# Patient Record
Sex: FEMALE | Race: OTHER | HISPANIC OR LATINO | URBAN - METROPOLITAN AREA
[De-identification: names, ages, dates, MRNs, and addresses within clinical notes are randomized per-mention and may not be internally consistent; named-entity substitution may affect disease eponyms.]

---

## 2017-10-25 ENCOUNTER — EMERGENCY (EMERGENCY)
Facility: HOSPITAL | Age: 42
LOS: 0 days | Discharge: HOME | End: 2017-10-25

## 2017-10-25 DIAGNOSIS — M54.42 LUMBAGO WITH SCIATICA, LEFT SIDE: ICD-10-CM

## 2017-10-25 DIAGNOSIS — M54.5 LOW BACK PAIN: ICD-10-CM

## 2017-10-25 DIAGNOSIS — Z87.891 PERSONAL HISTORY OF NICOTINE DEPENDENCE: ICD-10-CM

## 2017-10-27 PROBLEM — Z00.00 ENCOUNTER FOR PREVENTIVE HEALTH EXAMINATION: Status: ACTIVE | Noted: 2017-10-27

## 2017-11-28 ENCOUNTER — APPOINTMENT (OUTPATIENT)
Dept: INTERNAL MEDICINE | Facility: CLINIC | Age: 42
End: 2017-11-28

## 2017-12-01 ENCOUNTER — EMERGENCY (EMERGENCY)
Facility: HOSPITAL | Age: 42
LOS: 0 days | Discharge: HOME | End: 2017-12-01

## 2017-12-01 DIAGNOSIS — M54.5 LOW BACK PAIN: ICD-10-CM

## 2017-12-01 DIAGNOSIS — M54.42 LUMBAGO WITH SCIATICA, LEFT SIDE: ICD-10-CM

## 2017-12-01 DIAGNOSIS — R20.2 PARESTHESIA OF SKIN: ICD-10-CM

## 2017-12-01 DIAGNOSIS — M62.81 MUSCLE WEAKNESS (GENERALIZED): ICD-10-CM

## 2018-01-24 ENCOUNTER — APPOINTMENT (OUTPATIENT)
Dept: NEUROSURGERY | Facility: CLINIC | Age: 43
End: 2018-01-24
Payer: MEDICAID

## 2018-01-24 PROCEDURE — 99205 OFFICE O/P NEW HI 60 MIN: CPT

## 2020-09-01 ENCOUNTER — TRANSCRIPTION ENCOUNTER (OUTPATIENT)
Age: 45
End: 2020-09-01

## 2020-09-17 ENCOUNTER — TRANSCRIPTION ENCOUNTER (OUTPATIENT)
Age: 45
End: 2020-09-17

## 2021-03-22 ENCOUNTER — TRANSCRIPTION ENCOUNTER (OUTPATIENT)
Age: 46
End: 2021-03-22

## 2022-02-23 ENCOUNTER — EMERGENCY (EMERGENCY)
Facility: HOSPITAL | Age: 47
LOS: 0 days | Discharge: HOME | End: 2022-02-24
Attending: EMERGENCY MEDICINE | Admitting: EMERGENCY MEDICINE
Payer: MEDICAID

## 2022-02-23 VITALS
TEMPERATURE: 98 F | DIASTOLIC BLOOD PRESSURE: 71 MMHG | HEART RATE: 89 BPM | RESPIRATION RATE: 18 BRPM | OXYGEN SATURATION: 97 % | SYSTOLIC BLOOD PRESSURE: 130 MMHG | WEIGHT: 274.03 LBS | HEIGHT: 64 IN

## 2022-02-23 DIAGNOSIS — M54.50 LOW BACK PAIN, UNSPECIFIED: ICD-10-CM

## 2022-02-23 DIAGNOSIS — M25.562 PAIN IN LEFT KNEE: ICD-10-CM

## 2022-02-23 PROCEDURE — 73562 X-RAY EXAM OF KNEE 3: CPT | Mod: 26,LT

## 2022-02-23 PROCEDURE — 93971 EXTREMITY STUDY: CPT | Mod: 26,LT

## 2022-02-23 PROCEDURE — 99284 EMERGENCY DEPT VISIT MOD MDM: CPT

## 2022-02-23 RX ORDER — KETOROLAC TROMETHAMINE 30 MG/ML
30 SYRINGE (ML) INJECTION ONCE
Refills: 0 | Status: DISCONTINUED | OUTPATIENT
Start: 2022-02-23 | End: 2022-02-23

## 2022-02-23 RX ADMIN — Medication 30 MILLIGRAM(S): at 21:34

## 2022-02-23 NOTE — ED PROVIDER NOTE - PHYSICAL EXAMINATION
VITAL SIGNS: I have reviewed nursing notes and confirm.  CONSTITUTIONAL: Well-developed; well-nourished; in no acute distress.  SKIN: Skin exam is warm and dry, no acute rash, no warmth, redness, drainage to LE  HEAD: Normocephalic; atraumatic.  EYES: PERRL, EOM intact; conjunctiva and sclera clear.  CARD: RRR, no murmur.  RESP: No wheezes, rales or rhonchi.  ABD: Normal bowel sounds; soft; non-distended; non-tender  EXT: Normal ROM but has pain with active and passive ROM of L knee. Non pitting edema to L knee, distal thigh and proximal calf/ No clubbing, cyanosis. Good pulses, intact cap refill and sensation, antalgic gait  NEURO: Alert, oriented. Grossly unremarkable. No focal deficits.  PSYCH: Cooperative, appropriate.

## 2022-02-23 NOTE — ED PROVIDER NOTE - OBJECTIVE STATEMENT
46-year-old female with chronic back pain, currently following with pain management, presents to the ED with new left knee pain. Patient notes that on Saturday she twisted her knee, had very mild pain at that time, but has gradually developed worsening pain. Pain radiates up the leg to her left lower back and then across to her right lower back. Pain is worse with ambulation, improved with rest. No she has been taking her prescribed medication from pain management with minimal improvement in her symptoms. She notes associated swelling to the knee, stretching from mid thigh to mid calf.    No recent travel, prolonged immobilization, history of PE or DVT. No associated fever, chills, chest pain or shortness of breath. No associated paresthesias, change in temperature.

## 2022-02-23 NOTE — ED PROVIDER NOTE - PATIENT PORTAL LINK FT
You can access the FollowMyHealth Patient Portal offered by [facility] by registering at the following website: [email or web address]. By joining [employer]’s FollowMyHealth portal, you will also be able to view your health information using other applications (apps) compatible with our system.

## 2022-02-23 NOTE — ED ADULT NURSE NOTE - OBJECTIVE STATEMENT
Pt c/o swelling to left knee x 5 days. Pt states that pain radiates to the middle of her back. Pt states that she tripped and may have injured herself. Pt denies fall.

## 2022-02-23 NOTE — ED ADULT TRIAGE NOTE - CHIEF COMPLAINT QUOTE
I'm having pain and swelling in my left knee going all the way up to my middle back - patient  Patient denies fall, states she stumbled but didn't fall Saturday, pain started late Saturday night

## 2022-02-23 NOTE — ED PROVIDER NOTE - NS ED ROS FT
Constitutional: no fever, chills, no recent weight loss, change in appetite or malaise  Cardiac: No chest pain, SOB or edema.  Respiratory: No cough or respiratory distress  GI: No nausea, vomiting, diarrhea or abdominal pain.  : No dysuria, frequency, urgency or hematuria  MS: see HPI  Neuro: see HPI  Skin: No skin rash.  Except as documented in the HPI, all other systems are negative.

## 2022-02-23 NOTE — ED PROVIDER NOTE - CLINICAL SUMMARY MEDICAL DECISION MAKING FREE TEXT BOX
Duplex negative, XR with effusion but no fracture.    Discussed knee immobilizer and crutches with patient. Patient expressed concerns about getting up stairs due to habitus and immobilizer further worsening mobility. After shared decision making conversation we agreed on an ace wrap and crutches.    Will dc with rest, monitoring of sx, return precautions and follow up with ortho.

## 2022-02-23 NOTE — ED PROVIDER NOTE - CARE PROVIDER_API CALL
Cisco Carpenter)  Orthopaedic Surgery  [address] [place] [address]  Phone: [phone]  Fax: [phone]  Follow Up Time: 7-10 Days

## 2022-03-20 ENCOUNTER — INPATIENT (INPATIENT)
Facility: HOSPITAL | Age: 47
LOS: 1 days | Discharge: HOME | End: 2022-03-22
Attending: STUDENT IN AN ORGANIZED HEALTH CARE EDUCATION/TRAINING PROGRAM | Admitting: STUDENT IN AN ORGANIZED HEALTH CARE EDUCATION/TRAINING PROGRAM
Payer: MEDICAID

## 2022-03-20 VITALS
TEMPERATURE: 97 F | HEART RATE: 91 BPM | SYSTOLIC BLOOD PRESSURE: 137 MMHG | WEIGHT: 278 LBS | DIASTOLIC BLOOD PRESSURE: 77 MMHG | OXYGEN SATURATION: 96 % | HEIGHT: 64 IN | RESPIRATION RATE: 16 BRPM

## 2022-03-20 LAB
ALBUMIN SERPL ELPH-MCNC: 4.6 G/DL — SIGNIFICANT CHANGE UP (ref 3.5–5.2)
ALP SERPL-CCNC: 92 U/L — SIGNIFICANT CHANGE UP (ref 30–115)
ALT FLD-CCNC: 16 U/L — SIGNIFICANT CHANGE UP (ref 0–41)
ANION GAP SERPL CALC-SCNC: 14 MMOL/L — SIGNIFICANT CHANGE UP (ref 7–14)
AST SERPL-CCNC: 14 U/L — SIGNIFICANT CHANGE UP (ref 0–41)
BASOPHILS # BLD AUTO: 0.06 K/UL — SIGNIFICANT CHANGE UP (ref 0–0.2)
BASOPHILS NFR BLD AUTO: 0.5 % — SIGNIFICANT CHANGE UP (ref 0–1)
BILIRUB SERPL-MCNC: 0.3 MG/DL — SIGNIFICANT CHANGE UP (ref 0.2–1.2)
BUN SERPL-MCNC: 19 MG/DL — SIGNIFICANT CHANGE UP (ref 10–20)
CALCIUM SERPL-MCNC: 9.8 MG/DL — SIGNIFICANT CHANGE UP (ref 8.5–10.1)
CHLORIDE SERPL-SCNC: 101 MMOL/L — SIGNIFICANT CHANGE UP (ref 98–110)
CO2 SERPL-SCNC: 25 MMOL/L — SIGNIFICANT CHANGE UP (ref 17–32)
CREAT SERPL-MCNC: 0.7 MG/DL — SIGNIFICANT CHANGE UP (ref 0.7–1.5)
EGFR: 109 ML/MIN/1.73M2 — SIGNIFICANT CHANGE UP
EOSINOPHIL # BLD AUTO: 0.22 K/UL — SIGNIFICANT CHANGE UP (ref 0–0.7)
EOSINOPHIL NFR BLD AUTO: 1.9 % — SIGNIFICANT CHANGE UP (ref 0–8)
GLUCOSE SERPL-MCNC: 128 MG/DL — HIGH (ref 70–99)
HCG SERPL QL: NEGATIVE — SIGNIFICANT CHANGE UP
HCT VFR BLD CALC: 45.7 % — SIGNIFICANT CHANGE UP (ref 37–47)
HGB BLD-MCNC: 15.6 G/DL — SIGNIFICANT CHANGE UP (ref 12–16)
IMM GRANULOCYTES NFR BLD AUTO: 0.5 % — HIGH (ref 0.1–0.3)
LIDOCAIN IGE QN: 48 U/L — SIGNIFICANT CHANGE UP (ref 7–60)
LYMPHOCYTES # BLD AUTO: 3.53 K/UL — HIGH (ref 1.2–3.4)
LYMPHOCYTES # BLD AUTO: 30 % — SIGNIFICANT CHANGE UP (ref 20.5–51.1)
MCHC RBC-ENTMCNC: 31.6 PG — HIGH (ref 27–31)
MCHC RBC-ENTMCNC: 34.1 G/DL — SIGNIFICANT CHANGE UP (ref 32–37)
MCV RBC AUTO: 92.7 FL — SIGNIFICANT CHANGE UP (ref 81–99)
MONOCYTES # BLD AUTO: 0.64 K/UL — HIGH (ref 0.1–0.6)
MONOCYTES NFR BLD AUTO: 5.4 % — SIGNIFICANT CHANGE UP (ref 1.7–9.3)
NEUTROPHILS # BLD AUTO: 7.24 K/UL — HIGH (ref 1.4–6.5)
NEUTROPHILS NFR BLD AUTO: 61.7 % — SIGNIFICANT CHANGE UP (ref 42.2–75.2)
NRBC # BLD: 0 /100 WBCS — SIGNIFICANT CHANGE UP (ref 0–0)
PLATELET # BLD AUTO: 242 K/UL — SIGNIFICANT CHANGE UP (ref 130–400)
POTASSIUM SERPL-MCNC: 3.9 MMOL/L — SIGNIFICANT CHANGE UP (ref 3.5–5)
POTASSIUM SERPL-SCNC: 3.9 MMOL/L — SIGNIFICANT CHANGE UP (ref 3.5–5)
PROT SERPL-MCNC: 7.3 G/DL — SIGNIFICANT CHANGE UP (ref 6–8)
RBC # BLD: 4.93 M/UL — SIGNIFICANT CHANGE UP (ref 4.2–5.4)
RBC # FLD: 13.1 % — SIGNIFICANT CHANGE UP (ref 11.5–14.5)
SARS-COV-2 RNA SPEC QL NAA+PROBE: SIGNIFICANT CHANGE UP
SODIUM SERPL-SCNC: 140 MMOL/L — SIGNIFICANT CHANGE UP (ref 135–146)
TROPONIN T SERPL-MCNC: <0.01 NG/ML — SIGNIFICANT CHANGE UP
WBC # BLD: 11.75 K/UL — HIGH (ref 4.8–10.8)
WBC # FLD AUTO: 11.75 K/UL — HIGH (ref 4.8–10.8)

## 2022-03-20 PROCEDURE — 99220: CPT

## 2022-03-20 PROCEDURE — 71045 X-RAY EXAM CHEST 1 VIEW: CPT | Mod: 26

## 2022-03-20 PROCEDURE — 93010 ELECTROCARDIOGRAM REPORT: CPT

## 2022-03-20 NOTE — ED PROVIDER NOTE - NS ED ATTENDING STATEMENT MOD
This was a shared visit with the PHILL. I reviewed and verified the documentation and independently performed the documented:

## 2022-03-20 NOTE — ED PROVIDER NOTE - OBJECTIVE STATEMENT
47 y/o F with PMH HTN, HLD, pre-DM on metformin, asthma, chronic back pain on gabapentin, presents with moderate intermittent sharp L sided CP x 2 wks, +associated with intermittent sweating. no provoking factors, occasionally resolved with asa. no exertional component/n/v/coughing, fever, other sxs. each episode lasts seconds-to-minutes.   no fhx CAD/SCD.  no prior cardiac workup/has never seen a cardiologist.   She took all her medications tonight.   Meds: metformin, lisinopril, simvastatin, gabapentin. oxy as needed.

## 2022-03-20 NOTE — ED PROVIDER NOTE - NS ED ROS FT
Review of Systems    Constitutional: (-) fever   Eyes/ENT: (-) vision changes  Cardiovascular: (+) chest pain, (-) syncope (-) palpitations  Respiratory: (-) cough, (-) shortness of breath  Gastrointestinal: (-) vomiting, (-) diarrhea (-) abdominal pain  Genitourinary:  (-) dysuria   Musculoskeletal: (-) neck pain, (-) back pain, (-) leg pain/swelling  Integumentary: (-) rash, (-) edema  Neurological: (-) headache, (-) confusion  Hematologic: (-) easy bruising

## 2022-03-20 NOTE — ED PROVIDER NOTE - ATTENDING CONTRIBUTION TO CARE
45 yo f hx chronic back pain follows w/ pain management, htn, hld, pre-dm on metformin, no fhx cad  pt presents for eval of intermittent, substernal cp w/ radiation to L chest. intermittently associated sweating. sx over the past week. no exacerbating or alleviating factors.  no travel/ocp/hx vte    vss  gen- NAD, aaox3  card-rrr  lungs-ctab, no wheezing or rhonchi  abd-sntnd, no guarding or rebound  neuro- full str/sensation, cn ii-xii grossly intact, normal coordination    r/o acs  labs, xr, ekg, trop  if neg, can consider obs

## 2022-03-20 NOTE — ED PROVIDER NOTE - PHYSICAL EXAMINATION
PHYSICAL EXAM:    GENERAL: Alert, appears stated age, well appearing, non-toxic  SKIN: Warm, pink and dry. MMM.   HEAD: NC, AT  EYE: Normal lids/conjunctiva  ENT: Normal hearing, patent oropharynx   NECK: +supple. No meningismus, or JVD  Pulm: Bilateral BS, normal resp effort, no wheezes, stridor, or retractions  CV: RRR, no M/R/G, 2+and = radial pulses  Abd: soft, non-tender, obese. no rebound/guarding.   Mskel: no erythema, cyanosis, edema. no calf tenderness  Neuro: AAOx3. normal gait.

## 2022-03-21 LAB — TROPONIN T SERPL-MCNC: <0.01 NG/ML — SIGNIFICANT CHANGE UP

## 2022-03-21 PROCEDURE — 99221 1ST HOSP IP/OBS SF/LOW 40: CPT

## 2022-03-21 PROCEDURE — 93010 ELECTROCARDIOGRAM REPORT: CPT

## 2022-03-21 PROCEDURE — 75574 CT ANGIO HRT W/3D IMAGE: CPT | Mod: 26,MA

## 2022-03-21 PROCEDURE — 99217: CPT

## 2022-03-21 RX ORDER — METFORMIN HYDROCHLORIDE 850 MG/1
1 TABLET ORAL
Qty: 0 | Refills: 0 | DISCHARGE

## 2022-03-21 RX ORDER — ALBUTEROL 90 UG/1
3 AEROSOL, METERED ORAL
Qty: 0 | Refills: 0 | DISCHARGE

## 2022-03-21 RX ORDER — METOPROLOL TARTRATE 50 MG
100 TABLET ORAL ONCE
Refills: 0 | Status: COMPLETED | OUTPATIENT
Start: 2022-03-21 | End: 2022-03-21

## 2022-03-21 RX ORDER — SIMVASTATIN 20 MG/1
40 TABLET, FILM COATED ORAL AT BEDTIME
Refills: 0 | Status: DISCONTINUED | OUTPATIENT
Start: 2022-03-21 | End: 2022-03-22

## 2022-03-21 RX ORDER — CYCLOBENZAPRINE HYDROCHLORIDE 10 MG/1
1 TABLET, FILM COATED ORAL
Qty: 0 | Refills: 0 | DISCHARGE

## 2022-03-21 RX ORDER — CYCLOBENZAPRINE HYDROCHLORIDE 10 MG/1
10 TABLET, FILM COATED ORAL
Refills: 0 | Status: DISCONTINUED | OUTPATIENT
Start: 2022-03-21 | End: 2022-03-22

## 2022-03-21 RX ORDER — GABAPENTIN 400 MG/1
400 CAPSULE ORAL THREE TIMES A DAY
Refills: 0 | Status: DISCONTINUED | OUTPATIENT
Start: 2022-03-21 | End: 2022-03-22

## 2022-03-21 RX ORDER — REGADENOSON 0.08 MG/ML
0.4 INJECTION, SOLUTION INTRAVENOUS ONCE
Refills: 0 | Status: COMPLETED | OUTPATIENT
Start: 2022-03-21 | End: 2022-03-22

## 2022-03-21 RX ORDER — LISINOPRIL 2.5 MG/1
1 TABLET ORAL
Qty: 0 | Refills: 0 | DISCHARGE

## 2022-03-21 RX ORDER — ASPIRIN/CALCIUM CARB/MAGNESIUM 324 MG
81 TABLET ORAL DAILY
Refills: 0 | Status: DISCONTINUED | OUTPATIENT
Start: 2022-03-21 | End: 2022-03-22

## 2022-03-21 RX ORDER — OXYCODONE AND ACETAMINOPHEN 5; 325 MG/1; MG/1
2 TABLET ORAL EVERY 8 HOURS
Refills: 0 | Status: DISCONTINUED | OUTPATIENT
Start: 2022-03-21 | End: 2022-03-22

## 2022-03-21 RX ORDER — LISINOPRIL 2.5 MG/1
20 TABLET ORAL DAILY
Refills: 0 | Status: DISCONTINUED | OUTPATIENT
Start: 2022-03-22 | End: 2022-03-22

## 2022-03-21 RX ORDER — GABAPENTIN 400 MG/1
1 CAPSULE ORAL
Qty: 0 | Refills: 0 | DISCHARGE

## 2022-03-21 RX ORDER — CARIPRAZINE 1.5 MG/1
1 CAPSULE, GELATIN COATED ORAL
Qty: 0 | Refills: 0 | DISCHARGE

## 2022-03-21 RX ADMIN — GABAPENTIN 400 MILLIGRAM(S): 400 CAPSULE ORAL at 21:21

## 2022-03-21 RX ADMIN — SIMVASTATIN 40 MILLIGRAM(S): 20 TABLET, FILM COATED ORAL at 21:21

## 2022-03-21 RX ADMIN — Medication 100 MILLIGRAM(S): at 08:33

## 2022-03-21 RX ADMIN — Medication 81 MILLIGRAM(S): at 21:22

## 2022-03-21 NOTE — H&P ADULT - HISTORY OF PRESENT ILLNESS
Ms. Foster is a 46yoF with PMH DM2, HTN, HLD, bipolar disorder, obesity, asthma, herniated discs and chronic back pain presenting to [facility] ED on 3/21/22 with chest pain. Per patient, she has been having substernal pain on and off for about 2 weeks. She says it is not associated with activity or anything in particular; the pain comes and goes randomly and it slightly relieved with 81 mg ASA. She says the pain sometimes radiates down her left arm and left leg. She endorses headaches and blurred vision "on and off" for the past month and a half and has been having night sweats for the past 2 weeks. The pain is also associated with nausea but denies emesis.  Ms. Foster denies checking her BP at home. She smokes 1/2 ppd of cigarettes for past 20 years and smokes marijuana daily. She endorses eating fried food daily. She does not follow with a cardiologist and was supposed to see her PCP last week but missed her appointment. Of note, she fell at home 2 weeks ago and underwent a left knee MRI d/t knee swelling and has not obtained results yet.     In ED, HR 80s and /70s. Troponin negative x2, ECG normal and patient underwent CCTA which showed predominantly calcified plaque in the proximal LAD, CHADS-RADS 4/N. Will admit to cards [facility] for ischemic work-up.

## 2022-03-21 NOTE — ED CDU PROVIDER INITIAL DAY NOTE - OBJECTIVE STATEMENT
45 y/o F with PMH HTN, HLD, pre-DM on metformin, asthma, chronic back pain on gabapentin, presents with moderate intermittent sharp L sided CP x 2 wks, +associated with intermittent sweating. no provoking factors, occasionally resolved with asa. no exertional component/n/v/coughing, fever, other sxs. each episode lasts seconds-to-minutes.   no fhx CAD/SCD.  no prior cardiac workup/has never seen a cardiologist.   She took all her medications tonight.   Meds: metformin, lisinopril, simvastatin, gabapentin. oxy as needed.

## 2022-03-21 NOTE — H&P ADULT - NSHPLABSRESULTS_GEN_ALL_CORE
Comprehensive Metabolic Panel (03.20.22 @ 21:47)    Sodium, Serum: 140 mmol/L    Potassium, Serum: 3.9 mmol/L    Chloride, Serum: 101 mmol/L    Carbon Dioxide, Serum: 25 mmol/L    Anion Gap, Serum: 14 mmol/L    Blood Urea Nitrogen, Serum: 19 mg/dL    Creatinine, Serum: 0.7 mg/dL    Glucose, Serum: 128 mg/dL    Calcium, Total Serum: 9.8 mg/dL    Protein Total, Serum: 7.3 g/dL    Albumin, Serum: 4.6 g/dL    Bilirubin Total, Serum: 0.3 mg/dL    Alkaline Phosphatase, Serum: 92 U/L    Aspartate Aminotransferase (AST/SGOT): 14 U/L    Alanine Aminotransferase (ALT/SGPT): 16 U/L    eGFR: 109    Complete Blood Count + Automated Diff (03.20.22 @ 21:47)    WBC Count: 11.75 K/uL    RBC Count: 4.93 M/uL    Hemoglobin: 15.6 g/dL    Hematocrit: 45.7 %    Mean Cell Volume: 92.7 fL    Mean Cell Hemoglobin: 31.6 pg    Mean Cell Hemoglobin Conc: 34.1 g/dL    Red Cell Distrib Width: 13.1 %    Platelet Count - Automated: 242 K/uL    Auto Neutrophil #: 7.24 K/uL    Auto Lymphocyte #: 3.53 K/uL    Auto Monocyte #: 0.64 K/uL    Auto Eosinophil #: 0.22 K/uL    Auto Basophil #: 0.06 K/uL    Auto Neutrophil %: 61.7: Differential percentages must be correlated with absolute numbers for  clinical significance. %    Auto Lymphocyte %: 30.0 %    Auto Monocyte %: 5.4 %    Auto Eosinophil %: 1.9 %    Auto Basophil %: 0.5 %    Auto Immature Granulocyte %: 0.5: (Includes meta, myelo and promyelocytes) %    Nucleated RBC: 0 /100 WBCs    Troponin T, Serum (03.21.22 @ 01:52)    Troponin T, Serum: <0.01 ng/mL  Troponin T, Serum (03.20.22 @ 21:47)    Troponin T, Serum: <0.01 ng/mL    12 Lead ECG (03.21.22 @ 02:31)    Normal sinus rhythm  Left axis deviation  Minimal voltage criteria for LVH, may be normal variant  Abnormal ECG    CT Angio Heart and Coronaries w/ IV Cont (03.21.22 @ 10:17)     Indeterminate level of narrowing due to calcified and   noncalcified plaque within these segments.    Mixed plaque proximal aspect of a PLV branch arising from the RCA   resulting in severe narrowing.    The total Agatston coronary artery calcium score equals 40, which   corresponds to 98th percentile for age, gender and ethnicity.    CAD-RADS 4/N.

## 2022-03-21 NOTE — H&P ADULT - NSHPREVIEWOFSYSTEMS_GEN_ALL_CORE
CONSTITUTIONAL: No weakness, fevers or chills  EYES: Endorses intermittent dizziness  ENT:  No vertigo or throat pain   NECK: No pain or stiffness  RESPIRATORY: No cough, wheezing, hemoptysis; No shortness of breath  CARDIOVASCULAR: Substernal chest pain on and off  GASTROINTESTINAL: No abdominal or epigastric pain. Endorses frequent nausea; No diarrhea or constipation. No melena or hematochezia.  GENITOURINARY: No dysuria, frequency or hematuria  NEUROLOGICAL: Weakness LLE d/t fall 2 weeks prior  SKIN: No itching, rashes  Psych: Hx bipolar

## 2022-03-21 NOTE — ED ADULT NURSE REASSESSMENT NOTE - NS ED NURSE REASSESS COMMENT FT1
Pt is alert and orientedx4, denies chest pains, is still awake talking on her phone, safety in place, 2nd troponin sent to lab, to continue monitoring.

## 2022-03-21 NOTE — ED CDU PROVIDER INITIAL DAY NOTE - MEDICAL DECISION MAKING DETAILS
46-year-old female presents to the ED for left-sided chest pain.  History of hypertension hyperlipidemia and prediabetic on Metformin.  Patient was placed in observation status for CCTA.  Labs reviewed by me noted to be within normal limits.  Troponin negative x2.  ED Chest X-Ray reviewed by me which did not reveal a ptx, infiltrate, or effusion.  EKG noted to have left axis deviation.

## 2022-03-21 NOTE — ED CDU PROVIDER DISPOSITION NOTE - CLINICAL COURSE
Pt obs for cp, had + ccta, admit cards tele per Dr. Hudson Pt obs for cp, had + ccta, admit cards tele per Dr. Hudson    46-year-old female presents to the ED for left-sided chest pain.  History of hypertension hyperlipidemia and prediabetic on Metformin.  Patient was placed in observation status for CCTA.  Labs reviewed by me noted to be within normal limits.  Troponin negative x2.  ED Chest X-Ray reviewed by me which did not reveal a ptx, infiltrate, or effusion.  EKG noted to have left axis deviation.  CCTA performed and noted to have proximal LAD and ramus branch stenoses.  Severe narrowing in the RCA.  CAD RADS 4.  Aspirin given.  Admitted to telemetry for further cardiology evaluation.

## 2022-03-21 NOTE — H&P ADULT - ATTENDING COMMENTS
Patient with severe disease of distal vessel and poor quality imaging of major coronary arteries. Will pursue further work up with NST as above.

## 2022-03-21 NOTE — H&P ADULT - NSHPPHYSICALEXAM_GEN_ALL_CORE
PHYSICAL EXAM:  GENERAL: NAD, lying in bed comfortably  HEAD:  Atraumatic, Normocephalic  EYES: EOMI, PERRLA, conjunctiva and sclera clear  ENT: Moist mucous membranes  NECK: Supple, No JVD  CHEST/LUNG: Clear to auscultation bilaterally; Expiratory wheezing bilaterally  HEART: Regular rate and rhythm; No murmurs, rubs, or gallops  ABDOMEN: Bowel sounds present; Soft, Nontender, Nondistended   EXTREMITIES:  2+ Peripheral Pulses, brisk capillary refill. Generalized +1 edema  NERVOUS SYSTEM:  Alert & Oriented X3, speech clear. No deficits   MSK: FROM all 4 extremities, full and equal strength  SKIN: No rashes or lesions

## 2022-03-21 NOTE — H&P ADULT - ASSESSMENT
Assessment and Plan    Ms. Foster is a 46yoF with PMH DM2, HTN, HLD, bipolar disorder, obesity, asthma, herniated discs and chronic back pain presenting to [facility] ED on 3/21/22 with chest pain. Per patient, she has been having substernal pain on and off for about 2 weeks. She says it is not associated with activity or anything in particular; the pain comes and goes randomly and it slightly relieved with 81 mg ASA. She says the pain sometimes radiates down her left arm and left leg. She endorses headaches and blurred vision "on and off" for the past month and a half and has been having night sweats for the past 2 weeks. The pain is also associated with nausea but denies emesis.  Ms. Foster denies checking her BP at home. She smokes 1/2 ppd of cigarettes for past 20 years and smokes marijuana daily. She endorses eating fried food daily. She does not follow with a cardiologist and was supposed to see her PCP last week but missed her appointment. Of note, she fell at home 2 weeks ago and underwent a left knee MRI d/t knee swelling and has not obtained results yet.     HR 80s and /70s. Troponin negative x2, ECG normal and patient underwent CCTA which showed predominantly calcified plaque in the proximal LAD, CHADS-RADS 4/N. Will admit to [facility] for ischemic work-up.      #1 Chest pain  - Trend troponin, ECG  - Telemetry monitoring  - Risk stratification labs - TSH, lytes in AM  - CCTA showed mild plaque, f/u with pharm nuc stress test to r/o ischemic disease  - Continue ASA 81 daily    #2 HTN  - Continue lisinopril 20 mg daily  - Obtain UA to assess for hypertensive urgency    #3 HLD  - Continue simvastatin 40 mg daily  - Obtain lipid panel in AM    #4 Diabetes  - Finger sticks AC HS  - Sliding scale ordered  - A1C in AM    #5 Asthma  - Albuterol PRN    #6 Chronic pain   - Continue home dose of oxy/aceaminophen 10/325 BID PRN  - Gabapentin 400 mg TID    #7 Bipolar disorder  - Vrayler 1.5 mg daily    #8 Tobacco use  - Educate patient regarding cessation benefits      Diet: NPO for nuclear stress, DASH/TLC after  DVT prophylaxis: Not indicated  GI Prophylaxis: Not indicated  Dispo: Acute, eventual home    Please call [phone] with questions.    Ce Cat NP  Cardiology

## 2022-03-22 ENCOUNTER — TRANSCRIPTION ENCOUNTER (OUTPATIENT)
Age: 47
End: 2022-03-22

## 2022-03-22 VITALS
HEART RATE: 78 BPM | OXYGEN SATURATION: 96 % | SYSTOLIC BLOOD PRESSURE: 132 MMHG | TEMPERATURE: 97 F | RESPIRATION RATE: 18 BRPM | DIASTOLIC BLOOD PRESSURE: 99 MMHG

## 2022-03-22 LAB
A1C WITH ESTIMATED AVERAGE GLUCOSE RESULT: 6.8 % — HIGH (ref 4–5.6)
ANION GAP SERPL CALC-SCNC: 11 MMOL/L — SIGNIFICANT CHANGE UP (ref 7–14)
BUN SERPL-MCNC: 21 MG/DL — HIGH (ref 10–20)
CALCIUM SERPL-MCNC: 9.3 MG/DL — SIGNIFICANT CHANGE UP (ref 8.5–10.1)
CHLORIDE SERPL-SCNC: 108 MMOL/L — SIGNIFICANT CHANGE UP (ref 98–110)
CHOLEST SERPL-MCNC: 217 MG/DL — HIGH
CO2 SERPL-SCNC: 25 MMOL/L — SIGNIFICANT CHANGE UP (ref 17–32)
CREAT SERPL-MCNC: 0.7 MG/DL — SIGNIFICANT CHANGE UP (ref 0.7–1.5)
EGFR: 109 ML/MIN/1.73M2 — SIGNIFICANT CHANGE UP
ESTIMATED AVERAGE GLUCOSE: 148 MG/DL — HIGH (ref 68–114)
GLUCOSE SERPL-MCNC: 122 MG/DL — HIGH (ref 70–99)
HCT VFR BLD CALC: 45.4 % — SIGNIFICANT CHANGE UP (ref 37–47)
HDLC SERPL-MCNC: 38 MG/DL — LOW
HGB BLD-MCNC: 14.9 G/DL — SIGNIFICANT CHANGE UP (ref 12–16)
LIPID PNL WITH DIRECT LDL SERPL: 156 MG/DL — HIGH
MAGNESIUM SERPL-MCNC: 2 MG/DL — SIGNIFICANT CHANGE UP (ref 1.8–2.4)
MCHC RBC-ENTMCNC: 30.8 PG — SIGNIFICANT CHANGE UP (ref 27–31)
MCHC RBC-ENTMCNC: 32.8 G/DL — SIGNIFICANT CHANGE UP (ref 32–37)
MCV RBC AUTO: 94 FL — SIGNIFICANT CHANGE UP (ref 81–99)
NON HDL CHOLESTEROL: 179 MG/DL — HIGH
NRBC # BLD: 0 /100 WBCS — SIGNIFICANT CHANGE UP (ref 0–0)
PLATELET # BLD AUTO: 232 K/UL — SIGNIFICANT CHANGE UP (ref 130–400)
POTASSIUM SERPL-MCNC: 4.5 MMOL/L — SIGNIFICANT CHANGE UP (ref 3.5–5)
POTASSIUM SERPL-SCNC: 4.5 MMOL/L — SIGNIFICANT CHANGE UP (ref 3.5–5)
RBC # BLD: 4.83 M/UL — SIGNIFICANT CHANGE UP (ref 4.2–5.4)
RBC # FLD: 12.9 % — SIGNIFICANT CHANGE UP (ref 11.5–14.5)
SODIUM SERPL-SCNC: 144 MMOL/L — SIGNIFICANT CHANGE UP (ref 135–146)
TRIGL SERPL-MCNC: 143 MG/DL — SIGNIFICANT CHANGE UP
TROPONIN T SERPL-MCNC: <0.01 NG/ML — SIGNIFICANT CHANGE UP
WBC # BLD: 9.02 K/UL — SIGNIFICANT CHANGE UP (ref 4.8–10.8)
WBC # FLD AUTO: 9.02 K/UL — SIGNIFICANT CHANGE UP (ref 4.8–10.8)

## 2022-03-22 PROCEDURE — 93018 CV STRESS TEST I&R ONLY: CPT

## 2022-03-22 PROCEDURE — 93016 CV STRESS TEST SUPVJ ONLY: CPT

## 2022-03-22 PROCEDURE — 78452 HT MUSCLE IMAGE SPECT MULT: CPT | Mod: 26

## 2022-03-22 PROCEDURE — 99238 HOSP IP/OBS DSCHRG MGMT 30/<: CPT | Mod: 25

## 2022-03-22 RX ORDER — SIMVASTATIN 20 MG/1
1 TABLET, FILM COATED ORAL
Qty: 0 | Refills: 0 | DISCHARGE

## 2022-03-22 RX ORDER — ASPIRIN/CALCIUM CARB/MAGNESIUM 324 MG
1 TABLET ORAL
Qty: 30 | Refills: 0
Start: 2022-03-22 | End: 2022-04-20

## 2022-03-22 RX ORDER — ATORVASTATIN CALCIUM 80 MG/1
1 TABLET, FILM COATED ORAL
Qty: 30 | Refills: 0
Start: 2022-03-22 | End: 2022-04-20

## 2022-03-22 RX ADMIN — REGADENOSON 0.4 MILLIGRAM(S): 0.08 INJECTION, SOLUTION INTRAVENOUS at 12:08

## 2022-03-22 NOTE — DISCHARGE NOTE PROVIDER - NSDCCPCAREPLAN_GEN_ALL_CORE_FT
PRINCIPAL DISCHARGE DIAGNOSIS  Diagnosis: Unstable angina  Assessment and Plan of Treatment: YOu presented to the hospital with chest pain. There is a small blockage in one of the arteries in your heart, however, this is not causing any ischemia. Be sure to continue to take Aspirin and Simvastatin. If you have any chest pain or discomfort, be sure to notify your provider or return to the hospital.       PRINCIPAL DISCHARGE DIAGNOSIS  Diagnosis: Unstable angina  Assessment and Plan of Treatment: YOu presented to the hospital with chest pain. There is a small blockage in one of the arteries in your heart, however, this is not causing any ischemia. Be sure to take Aspirin 81 mg daily. We recommend that you stop taking Simvastatin and take Atorvastatin 80 mg instead as your cholesterol was elevated.  If you have any chest pain or discomfort, be sure to notify your provider or return to the hospital.

## 2022-03-22 NOTE — PROGRESS NOTE ADULT - SUBJECTIVE AND OBJECTIVE BOX
Patient Information:  DIOGENES OLIVARES / 46y / Female / MRN#:[id number]    Hospital Day: 1d    Interval History:  Patient seen and examined at bedside. No acute events overnight. Pt reports her chest pain is improved, did have a few episodes of mild L sided chest discomfort, describes as sharp, which self resolved overnight.    Past Medical History:    Past Surgical History:    Allergies:  No Known Allergies    Medications:  PRN:  cyclobenzaprine 10 milliGRAM(s) Oral two times a day PRN Muscle Spasm  oxycodone    5 mG/acetaminophen 325 mG 2 Tablet(s) Oral every 8 hours PRN Moderate Pain (4 - 6)    Standing:  aspirin enteric coated 81 milliGRAM(s) Oral daily  gabapentin 400 milliGRAM(s) Oral three times a day  lisinopril 20 milliGRAM(s) Oral daily  regadenoson Injectable 0.4 milliGRAM(s) IV Push once  simvastatin 40 milliGRAM(s) Oral at bedtime    Home:  albuterol 2.5 mg/3 mL (0.083%) inhalation solution: 3 milliliter(s) inhaled every 6 hours, As Needed  cyclobenzaprine 10 mg oral tablet: 1 tab(s) orally 2 times a day  gabapentin 400 mg oral capsule: 1 cap(s) orally 3 times a day  lisinopril 20 mg oral tablet: 1 tab(s) orally once a day  metFORMIN 1000 mg oral tablet, extended release: 1 tab(s) orally once a day  oxycodone-acetaminophen 10 mg-325 mg oral tablet: 1 tab(s) orally 2 times a day, As Needed  simvastatin 40 mg oral tablet: 1 tab(s) orally once a day (at bedtime)  Vraylar 1.5 mg oral capsule: 1 cap(s) orally once a day    Vitals:  T(C): 36.3, Max: 36.3 (03-22-22 @ 02:20)  T(F): 97.4, Max: 97.4 (03-22-22 @ 02:20)  HR: 78 (72 - 78)  BP: 131/83 (120/58 - 131/83)  RR: 18 (18 - 18)  SpO2: 94% (94% - 96%)    Physical Exam:  General: Awake, alert, NAD, resting comfortably in bed, on RA  HEENT: Head NC/AT  Heart: RRR; S1/S2; no rubs, murmurs appreciated  Lungs: Clear to auscultation bilaterally, no wheezing, rales or rhonchi  Abdomen: Soft, nontender, nondistended, BS+  Extremities: No edema, clubbing, cyanosis in upper or lower extremities.  Neuro: AAOx3, NFD.    Labs:  CBC (03-22 @ 06:30)                        Hgb: 14.9   WBC: 9.02  )-----------------( Plts: 232                              Hct: 45.4     Chem (03-22 @ 06:30)  Na: 144  |     Cl: 108     |  BUN: 21  -----------------------------------------< Gluc: 122    K: 4.5   |    HCO3: 25  |  Cr: 0.7    Ca 9.3 (03-22 @ 06:30)  Mg 2.0 (03-22 @ 06:30)    LFTs (03-20 @ 21:47)  TPro 7.3  /  Alb 4.6  TBili 0.3  /  DBili     AST 14  /  ALT 16  /  AlkPhos 92    Cardiac Markers (03-22 @ 06:30)  Troponin I X  Troponin T <0.01  CK X  CKMB X  CKMB Units X  Myoglobin X  Lactate X  ESR X    Cardiac Markers (03-21 @ 01:52)  Troponin I X  Troponin T <0.01  CK X  CKMB X  CKMB Units X  Myoglobin X  Lactate X  ESR X    Cardiac Markers (03-20 @ 21:47)  Troponin I X  Troponin T <0.01  CK X  CKMB X  CKMB Units X  Myoglobin X  Lactate X  ESR X

## 2022-03-22 NOTE — DISCHARGE NOTE PROVIDER - NSDCMRMEDTOKEN_GEN_ALL_CORE_FT
albuterol 2.5 mg/3 mL (0.083%) inhalation solution: 3 milliliter(s) inhaled every 6 hours, As Needed  aspirin 81 mg oral delayed release tablet: 1 tab(s) orally once a day  cyclobenzaprine 10 mg oral tablet: 1 tab(s) orally 2 times a day  gabapentin 400 mg oral capsule: 1 cap(s) orally 3 times a day  lisinopril 20 mg oral tablet: 1 tab(s) orally once a day  metFORMIN 1000 mg oral tablet, extended release: 1 tab(s) orally once a day  oxycodone-acetaminophen 10 mg-325 mg oral tablet: 1 tab(s) orally 2 times a day, As Needed  simvastatin 40 mg oral tablet: 1 tab(s) orally once a day (at bedtime)  Vraylar 1.5 mg oral capsule: 1 cap(s) orally once a day   albuterol 2.5 mg/3 mL (0.083%) inhalation solution: 3 milliliter(s) inhaled every 6 hours, As Needed  aspirin 81 mg oral delayed release tablet: 1 tab(s) orally once a day  atorvastatin 80 mg oral tablet: 1 tab(s) orally once a day (at bedtime)   cyclobenzaprine 10 mg oral tablet: 1 tab(s) orally 2 times a day  gabapentin 400 mg oral capsule: 1 cap(s) orally 3 times a day  lisinopril 20 mg oral tablet: 1 tab(s) orally once a day  metFORMIN 1000 mg oral tablet, extended release: 1 tab(s) orally once a day  oxycodone-acetaminophen 10 mg-325 mg oral tablet: 1 tab(s) orally 2 times a day, As Needed  Vraylar 1.5 mg oral capsule: 1 cap(s) orally once a day

## 2022-03-22 NOTE — DISCHARGE NOTE PROVIDER - PROVIDER TOKENS
PROVIDER:[TOKEN:[[id number]],FOLLOWUP:[2 weeks]] PROVIDER:[TOKEN:[[id number]],FOLLOWUP:[2 weeks]],PROVIDER:[TOKEN:[[id number]],FOLLOWUP:[1 month]]

## 2022-03-22 NOTE — PROGRESS NOTE ADULT - ASSESSMENT
45yo F with PMHx of DM, HTN, DLD, Bipolar, Obesity, Asthma, Chronic Back pain presents with chest pain.    #Unstable Angina  - Troponin within normal limits  - CCTA - mild plaque in proximal LAD  - Pending NST today to rule out ishemic heart disease 45yo F with PMHx of DM, HTN, DLD, Bipolar, Obesity, Asthma, Chronic Back pain presents with chest pain.    #Unstable Angina  #DLD  - Troponin within normal limits  - EKG - no ischemic changes  - , A1c pending  - CCTA - mild plaque in proximal LAD  - Pending NST today to rule out ischemic heart disease    #HTN  - C/w Lisinopril    #Chronic Back Pain  - C/w Gabapentin    Diet; DASH, NPO for NST  Activity: IAT  Full Code  Dispo: Pending NST

## 2022-03-22 NOTE — DISCHARGE NOTE PROVIDER - CARE PROVIDER_API CALL
Harjinder Robert (NP; RN)  NP in Family Health  [address] [place] [address]  Phone: [phone]  Fax: [phone]  Follow Up Time: 2 weeks   Harjinder Robert (NP; RN)  NP in Family Health  [address] [place] [address]  Phone: [phone]  Fax: [phone]  Follow Up Time: 2 weeks    Leandra Pandya; MPH)  Internal Medicine  [address] [place] [address] [address]  [place] [address]  Phone: [phone]  Fax: [phone]  Follow Up Time: 1 month

## 2022-03-22 NOTE — DISCHARGE NOTE PROVIDER - CARE PROVIDERS DIRECT ADDRESSES
[email or web address]

## 2022-03-22 NOTE — DISCHARGE NOTE PROVIDER - HOSPITAL COURSE
46yoF with PMH DM2, HTN, HLD, bipolar disorder, obesity, asthma, herniated discs and chronic back pain presenting to [facility] ED on 3/21/22 with chest pain. Per patient, she has been having substernal pain on and off for about 2 weeks. She says it is not associated with activity or anything in particular; the pain comes and goes randomly and it slightly relieved with 81 mg ASA. She says the pain sometimes radiates down her left arm and left leg. She endorses headaches and blurred vision "on and off" for the past month and a half and has been having night sweats for the past 2 weeks. The pain is also associated with nausea but denies emesis.  Ms. Foster denies checking her BP at home. She smokes 1/2 ppd of cigarettes for past 20 years and smokes marijuana daily. She endorses eating fried food daily. She does not follow with a cardiologist and was supposed to see her PCP last week but missed her appointment. Of note, she fell at home 2 weeks ago and underwent a left knee MRI d/t knee swelling and has not obtained results yet.     In ED, HR 80s and /70s. Troponin negative x2, ECG normal and patient underwent CCTA which showed predominantly calcified plaque in the proximal LAD, CHADS-RADS 4/N. NST revealed no ischemia. Stable for discharge home. 46yoF with PMH DM2, HTN, HLD, bipolar disorder, obesity, asthma, herniated discs and chronic back pain presenting to [facility] ED on 3/21/22 with chest pain. Per patient, she has been having substernal pain on and off for about 2 weeks. She says it is not associated with activity or anything in particular; the pain comes and goes randomly and it slightly relieved with 81 mg ASA. She says the pain sometimes radiates down her left arm and left leg. She endorses headaches and blurred vision "on and off" for the past month and a half and has been having night sweats for the past 2 weeks. The pain is also associated with nausea but denies emesis.  Ms. Foster denies checking her BP at home. She smokes 1/2 ppd of cigarettes for past 20 years and smokes marijuana daily. She endorses eating fried food daily. She does not follow with a cardiologist and was supposed to see her PCP last week but missed her appointment. Of note, she fell at home 2 weeks ago and underwent a left knee MRI d/t knee swelling and has not obtained results yet.     In ED, HR 80s and /70s. Troponin negative x2, ECG normal and patient underwent CCTA which showed predominantly calcified plaque in the proximal LAD, CHADS-RADS 4/N. NST revealed no ischemia. Stable for discharge home on ASA 81 mg and atorvastatin 80 mg nightly. She will follow up with Dr. Pandya as an outpatient.

## 2022-03-22 NOTE — DISCHARGE NOTE NURSING/CASE MANAGEMENT/SOCIAL WORK - PATIENT PORTAL LINK FT
You can access the FollowMyHealth Patient Portal offered by [facility] by registering at the following website: [email or web address]. By joining [employer]’s FollowMyHealth portal, you will also be able to view your health information using other applications (apps) compatible with our system.

## 2022-03-22 NOTE — DISCHARGE NOTE PROVIDER - NSDCQMSTROKE_NEU_ALL_CORE
Discharge criteria met. Post procedure dressing dry and intact. Sensory and motor function intact as per pre-procedure. Patient alert and oriented x3  Instructions and follow up reviewed with pt at patient at discharge. Discharged home transported by wheelchair, accompanied by family .   Discharged @   No

## 2022-03-22 NOTE — DISCHARGE NOTE NURSING/CASE MANAGEMENT/SOCIAL WORK - NSDCPEFALRISK_GEN_ALL_CORE
For information on Fall & Injury Prevention, visit: [email or web address] OR  [email or web address] OR  https://www.cdc.gov/steadi/patient.html

## 2022-03-23 PROBLEM — Z00.00 ENCOUNTER FOR PREVENTIVE HEALTH EXAMINATION: Noted: 2022-03-23

## 2022-03-25 DIAGNOSIS — G89.29 OTHER CHRONIC PAIN: ICD-10-CM

## 2022-03-25 DIAGNOSIS — Z79.84 LONG TERM (CURRENT) USE OF ORAL HYPOGLYCEMIC DRUGS: ICD-10-CM

## 2022-03-25 DIAGNOSIS — E66.9 OBESITY, UNSPECIFIED: ICD-10-CM

## 2022-03-25 DIAGNOSIS — R73.03 PREDIABETES: ICD-10-CM

## 2022-03-25 DIAGNOSIS — E78.5 HYPERLIPIDEMIA, UNSPECIFIED: ICD-10-CM

## 2022-03-25 DIAGNOSIS — F31.9 BIPOLAR DISORDER, UNSPECIFIED: ICD-10-CM

## 2022-03-25 DIAGNOSIS — R07.9 CHEST PAIN, UNSPECIFIED: ICD-10-CM

## 2022-03-25 DIAGNOSIS — I10 ESSENTIAL (PRIMARY) HYPERTENSION: ICD-10-CM

## 2022-03-25 DIAGNOSIS — J45.909 UNSPECIFIED ASTHMA, UNCOMPLICATED: ICD-10-CM

## 2022-03-25 DIAGNOSIS — M54.9 DORSALGIA, UNSPECIFIED: ICD-10-CM

## 2022-03-25 DIAGNOSIS — I25.110 ATHEROSCLEROTIC HEART DISEASE OF NATIVE CORONARY ARTERY WITH UNSTABLE ANGINA PECTORIS: ICD-10-CM

## 2022-04-28 ENCOUNTER — APPOINTMENT (OUTPATIENT)
Dept: CARDIOLOGY | Facility: CLINIC | Age: 47
End: 2022-04-28
Payer: MEDICAID

## 2022-04-28 VITALS
DIASTOLIC BLOOD PRESSURE: 82 MMHG | HEART RATE: 73 BPM | SYSTOLIC BLOOD PRESSURE: 130 MMHG | BODY MASS INDEX: 50.02 KG/M2 | WEIGHT: 293 LBS | OXYGEN SATURATION: 95 % | HEIGHT: 64 IN | TEMPERATURE: 97.3 F

## 2022-04-28 PROCEDURE — 93000 ELECTROCARDIOGRAM COMPLETE: CPT

## 2022-04-28 PROCEDURE — 99214 OFFICE O/P EST MOD 30 MIN: CPT

## 2022-04-28 RX ORDER — ASPIRIN 81 MG
81 TABLET, DELAYED RELEASE (ENTERIC COATED) ORAL DAILY
Refills: 0 | Status: ACTIVE | COMMUNITY

## 2022-04-28 RX ORDER — METFORMIN HYDROCHLORIDE 1000 MG/1
1000 TABLET, COATED ORAL TWICE DAILY
Refills: 0 | Status: ACTIVE | COMMUNITY

## 2022-04-28 RX ORDER — ALBUTEROL SULFATE 2.5 MG/3ML
(2.5 MG/3ML) SOLUTION RESPIRATORY (INHALATION) 4 TIMES DAILY
Refills: 0 | Status: ACTIVE | COMMUNITY

## 2022-04-28 RX ORDER — OXYCODONE 10 MG/1
10 TABLET ORAL TWICE DAILY
Refills: 0 | Status: ACTIVE | COMMUNITY

## 2022-04-28 RX ORDER — GABAPENTIN 400 MG/1
400 CAPSULE ORAL 3 TIMES DAILY
Refills: 0 | Status: ACTIVE | COMMUNITY

## 2022-04-30 NOTE — CARDIOLOGY SUMMARY
[de-identified] : EKG 4/28/22 Normal sinus rhythm 73 bpm, LAD, LVH\par  [de-identified] : Cor CTA 3/21/22 motion degraded exam, multiple segments not reliably evaluated [de-identified] : Regadenoson NST 3/22/22 no evidence of ischemia, nl LV wall motion, LVEF 63%

## 2022-04-30 NOTE — HISTORY OF PRESENT ILLNESS
[FreeTextEntry1] : 46F  (no gDM, gHTN, preeclampsia)  with T2DM, HTN, DLD, asthma here for hospital follow-up and to establish care.\par \par Went to ED 3/21/22 for chest pain. \par /70s, trop negative, \par Cor CTA with plaque, but motion degraded. \par Nuclear stress test with no ischemia\par Discharged home on ASA and atorva 80\par \par Feeling okay. No chest pain, shortness of breath, palpitations, lightheadedness/dizziness, pre-syncope/syncope, or lower extremity edema.\par \par Baking more, less fried foods. Trying to lose weight\par \par Exercise: following 2 and 5 yo around\par Tries to walk, gets tired and has sciatica. \par \par Smoking since 14 years old, 1/2 ppd \par stopped for 7 months 10 years ago (cold turkey)\par MJ daily

## 2022-04-30 NOTE — ASSESSMENT
[FreeTextEntry1] : Assessment:\par #CAD\par - CCTA 3/2022 with plaque, severity indeterminate\par - NST 3/2022 negative for ischemia\par #HTN\par - 130/82 \par #HLD\par - 3/22/22 , , HDL 38,  \par - 4/7/22 , , HDL 38, LDL 81 on atorva 80 for about 3 weeks\par #T2DM\par - 3/22/22 Hg A1c 6.8%\par #BMI 51\par #Active smoker\par \par 3/22/22\par Hgb 14.9, K 4.5, Cr 0.7, AST 14, ALT 17\par \par Plan:\par - Counseled patient on aggressive risk factor reduction with diet, exercise, smoking cessation\par - Referral to dietician\par - Smoking cessation - referral to cessation program (Patient's number [phone])\par - Continue ASA 81 mg daily, atorva 80, lisinopril 40 mg daily\par - Return to clinic in 3 months\par

## 2022-04-30 NOTE — PHYSICAL EXAM
[Obese] : obese [No Carotid Bruit] : no carotid bruit [Clear Lung Fields] : clear lung fields [Moves all extremities] : moves all extremities [No Focal Deficits] : no focal deficits [Normal Speech] : normal speech [No edema] : no edema [Present] : present bilaterally [de-identified] : difficult to auscultate heart sounds due to body habitus

## 2022-04-30 NOTE — REVIEW OF SYSTEMS
[Fever] : no fever [Dyspnea on exertion] : dyspnea during exertion [Chest Discomfort] : no chest discomfort [Lower Ext Edema] : no extremity edema [Leg Claudication] : no intermittent leg claudication [Palpitations] : no palpitations [Orthopnea] : no orthopnea [PND] : no PND [Syncope] : no syncope [Cough] : no cough [Dizziness] : no dizziness [FreeTextEntry9] : sciatica limits her ability to walk

## 2022-06-02 ENCOUNTER — APPOINTMENT (OUTPATIENT)
Dept: NUTRITION | Facility: CLINIC | Age: 47
End: 2022-06-02

## 2022-10-10 ENCOUNTER — APPOINTMENT (OUTPATIENT)
Dept: CARDIOLOGY | Facility: CLINIC | Age: 47
End: 2022-10-10

## 2022-10-10 VITALS
BODY MASS INDEX: 49.68 KG/M2 | SYSTOLIC BLOOD PRESSURE: 124 MMHG | HEART RATE: 94 BPM | WEIGHT: 291 LBS | HEIGHT: 64 IN | DIASTOLIC BLOOD PRESSURE: 76 MMHG

## 2022-10-10 PROCEDURE — 93000 ELECTROCARDIOGRAM COMPLETE: CPT

## 2022-10-10 PROCEDURE — 99214 OFFICE O/P EST MOD 30 MIN: CPT | Mod: 25

## 2022-10-10 RX ORDER — BUDESONIDE AND FORMOTEROL FUMARATE DIHYDRATE 160; 4.5 UG/1; UG/1
160-4.5 AEROSOL RESPIRATORY (INHALATION)
Qty: 10 | Refills: 0 | Status: ACTIVE | COMMUNITY
Start: 2022-07-28

## 2022-10-10 NOTE — REVIEW OF SYSTEMS
[Dyspnea on exertion] : dyspnea during exertion [Fever] : no fever [Chest Discomfort] : no chest discomfort [Lower Ext Edema] : no extremity edema [Leg Claudication] : no intermittent leg claudication [Palpitations] : no palpitations [Orthopnea] : no orthopnea [PND] : no PND [Syncope] : no syncope [Cough] : no cough [Dizziness] : no dizziness [FreeTextEntry9] : LLE sciatica limits her ability to exercise

## 2022-10-10 NOTE — CARDIOLOGY SUMMARY
[de-identified] : EKG 10/10/22 Normal sinus rhythm 94 bpm, LAD, LVH\par EKG 4/28/22 Normal sinus rhythm 73 bpm, LAD, LVH\par  [de-identified] : Cor CTA 3/21/22 motion degraded exam, multiple segments not reliably evaluated [de-identified] : Regadenoson NST 3/22/22 no evidence of ischemia, nl LV wall motion, LVEF 63%

## 2022-10-10 NOTE — HISTORY OF PRESENT ILLNESS
[FreeTextEntry1] : 46F  (no gDM, gHTN, preeclampsia)  with T2DM, HTN, DLD, asthma here for follow-up. \par \par 10/10/22\par Walking more, every other day or daily 2-3 hours. Some SOB. No chest pain.\par \par Drinking water. Eating smaller portions, baking meat, more vegetables. Less fried foods, eating out. Interested in speaking with a dietician. \par \par Still smoking. More than before due to stress at home. Looking for a new place to live.\par \par Compliant with medications. \par \par 22\par Went to ED 3/21/22 for chest pain. \par /70s, trop negative, \par Cor CTA with plaque, but motion degraded. \par Nuclear stress test with no ischemia\par Discharged home on ASA and atorva 80\par \par Feeling okay. No chest pain, shortness of breath, palpitations, lightheadedness/dizziness, pre-syncope/syncope, or lower extremity edema.\par \par Baking more, less fried foods. Trying to lose weight\par \par Exercise: following 2 and 5 yo around\par Tries to walk, gets tired and has sciatica. \par \par Smoking since 14 years old, 1/2 ppd \par stopped for 7 months 10 years ago (cold turkey)\par MJ daily

## 2022-10-10 NOTE — PHYSICAL EXAM
[Obese] : obese [No Carotid Bruit] : no carotid bruit [Clear Lung Fields] : clear lung fields [Moves all extremities] : moves all extremities [No Focal Deficits] : no focal deficits [Normal Speech] : normal speech [No edema] : no edema [Present] : present bilaterally [de-identified] : difficult to auscultate heart sounds due to body habitus

## 2022-10-10 NOTE — ASSESSMENT
[FreeTextEntry1] : Assessment:\par #CAD\par - CCTA 3/2022 with plaque, severity indeterminate\par - NST 3/2022 negative for ischemia\par #HTN - controlled\par #HLD\par - 3/22/22 , , HDL 38,  \par - 4/7/22 , , HDL 38, LDL 81 on atorva 80 for about 3 weeks\par #T2DM\par - 3/22/22 Hg A1c 6.8%\par #BMI 49.9\par - 10 lb weight loss since last visit\par #Active smoker\par \par Plan:\par - Counseled patient on aggressive risk factor reduction with diet, exercise, smoking cessation\par - Referral to dietician, patient ready to speak with RD now\par - Smoking cessation - referral to cessation program (Patient's number [phone])\par - Continue ASA 81 mg daily, atorva 80, lisinopril 40 mg daily\par - Labs prior to next visit: CMP, Hgb A1c, lipid profile, lpa\par - Return to clinic in 6 months\par

## 2022-10-11 ENCOUNTER — NON-APPOINTMENT (OUTPATIENT)
Age: 47
End: 2022-10-11

## 2022-11-02 ENCOUNTER — NON-APPOINTMENT (OUTPATIENT)
Age: 47
End: 2022-11-02

## 2022-11-03 ENCOUNTER — APPOINTMENT (OUTPATIENT)
Dept: NUTRITION | Facility: CLINIC | Age: 47
End: 2022-11-03

## 2022-11-03 ENCOUNTER — OUTPATIENT (OUTPATIENT)
Dept: OUTPATIENT SERVICES | Facility: HOSPITAL | Age: 47
LOS: 1 days | Discharge: HOME | End: 2022-11-03

## 2023-03-03 NOTE — ED PROVIDER NOTE - NSFOLLOWUPINSTRUCTIONS_ED_ALL_ED_FT
Take ibuprofen 600mg (3 200mg tablets) every 8 hours for the next 5 days. This will help with inflammation as well as pain. Continue to take your other medication.      Knee Pain    WHAT YOU NEED TO KNOW:    Knee pain may start suddenly, or it may be a long-term problem. You may have pain on the side, front, or back of your knee. You may have knee stiffness and swelling. You may hear popping sounds or feel like your knee is giving way or locking up as you walk. You may feel pain when you sit, stand, walk, or climb up and down stairs. Knee pain can be caused by conditions such as obesity, inflammation, or strains or tears in ligaments or tendons.     DISCHARGE INSTRUCTIONS:    Return to the emergency department if:   •Your pain is worse, even after treatment.       •You cannot bend or straighten your leg completely.       •The swelling around your knee does not go down even with treatment.      •Your knee is painful and hot to the touch.       Contact your healthcare provider if:   •You have questions or concerns about your condition or care.           Medicines: You may need any of the following:   •NSAIDs help decrease swelling and pain or fever. This medicine is available with or without a doctor's order. NSAIDs can cause stomach bleeding or kidney problems in certain people. If you take blood thinner medicine, always ask your healthcare provider if NSAIDs are safe for you. Always read the medicine label and follow directions.      •Acetaminophen decreases pain and fever. It is available without a doctor's order. Ask how much to take and how often to take it. Follow directions. Read the labels of all other medicines you are using to see if they also contain acetaminophen, or ask your doctor or pharmacist. Acetaminophen can cause liver damage if not taken correctly. Do not use more than 4 grams (4,000 milligrams) total of acetaminophen in one day.       •Prescription pain medicine may be given. Ask your healthcare provider how to take this medicine safely. Some prescription pain medicines contain acetaminophen. Do not take other medicines that contain acetaminophen without talking to your healthcare provider. Too much acetaminophen may cause liver damage. Prescription pain medicine may cause constipation. Ask your healthcare provider how to prevent or treat constipation.       •Take your medicine as directed. Contact your healthcare provider if you think your medicine is not helping or if you have side effects. Tell him or her if you are allergic to any medicine. Keep a list of the medicines, vitamins, and herbs you take. Include the amounts, and when and why you take them. Bring the list or the pill bottles to follow-up visits. Carry your medicine list with you in case of an emergency.      What you can do to manage your symptoms:   •Rest your knee so it can heal. Limit activities that increase your pain. Do low-impact exercises, such as walking or swimming.       •Apply ice to help reduce swelling and pain. Use an ice pack, or put crushed ice in a plastic bag. Cover it with a towel before you apply it to your knee. Apply ice for 15 to 20 minutes every hour, or as directed.      •Apply compression to help reduce swelling. Use a brace or bandage only as directed.      •Elevate your knee to help decrease pain and swelling. Elevate your knee while you are sitting or lying down. Prop your leg on pillows to keep your knee above the level of your heart.      •Prevent your knee from moving as directed. Your healthcare provider may put on a cast or splint. You may need to wear a leg brace to stabilize your knee. A leg brace can be adjusted to increase your range of motion as your knee heals.      What you can do to prevent knee pain:   •Maintain a healthy weight. Extra weight increases your risk for knee pain. Ask your healthcare provider how much you should weigh. He or she can help you create a safe weight loss plan if you need to lose weight.      •Exercise or train properly. Use the correct equipment for sports. Wear shoes that provide good support. Check your posture often as you exercise, play sports, or train for an event. This can help prevent stress and strain on your knees. Rest between sessions so you do not overwork your knees.      Follow up with your healthcare provider within 24 hours or as directed: You may need follow-up treatments, such as steroid injections to decrease pain. Write down your questions so you remember to ask them during your visits. cancer

## 2023-03-13 ENCOUNTER — RX RENEWAL (OUTPATIENT)
Age: 48
End: 2023-03-13

## 2023-03-29 ENCOUNTER — RX RENEWAL (OUTPATIENT)
Age: 48
End: 2023-03-29

## 2023-04-03 ENCOUNTER — APPOINTMENT (OUTPATIENT)
Dept: CARDIOLOGY | Facility: CLINIC | Age: 48
End: 2023-04-03
Payer: MEDICAID

## 2023-04-03 VITALS
BODY MASS INDEX: 47.97 KG/M2 | WEIGHT: 281 LBS | HEART RATE: 89 BPM | HEIGHT: 64 IN | DIASTOLIC BLOOD PRESSURE: 80 MMHG | SYSTOLIC BLOOD PRESSURE: 130 MMHG | OXYGEN SATURATION: 96 %

## 2023-04-03 DIAGNOSIS — Z71.82 EXERCISE COUNSELING: ICD-10-CM

## 2023-04-03 DIAGNOSIS — R06.83 SNORING: ICD-10-CM

## 2023-04-03 DIAGNOSIS — Z71.3 DIETARY COUNSELING AND SURVEILLANCE: ICD-10-CM

## 2023-04-03 PROCEDURE — 99214 OFFICE O/P EST MOD 30 MIN: CPT | Mod: 25

## 2023-04-03 PROCEDURE — 93000 ELECTROCARDIOGRAM COMPLETE: CPT

## 2023-04-03 NOTE — HISTORY OF PRESENT ILLNESS
[FreeTextEntry1] : 47F  (no gDM, gHTN, preeclampsia)  with T2DM, HTN, DLD, asthma here for follow-up. \par \par 4/3/23\par Poor quality sleep. +snoring\par Occasional sharp CP. \par Trying to lose weight. Walk distance has improved. No exertional CP. \par Losing weight. Eating healthier. Minimal red meat. \par \par Labs done 23. Compliant with medications. \par \par 10/10/22\par Walking more, every other day or daily 2-3 hours. Some SOB. No chest pain.\par \par Drinking water. Eating smaller portions, baking meat, more vegetables. Less fried foods, eating out. Interested in speaking with a dietician. \par \par Still smoking. More than before due to stress at home. Looking for a new place to live.\par \par Compliant with medications. \par \par 22\par Went to ED 3/21/22 for chest pain. \par /70s, trop negative, \par Cor CTA with plaque, but motion degraded. \par Nuclear stress test with no ischemia\par Discharged home on ASA and atorva 80\par \par Feeling okay. No chest pain, shortness of breath, palpitations, lightheadedness/dizziness, pre-syncope/syncope, or lower extremity edema.\par \par Baking more, less fried foods. Trying to lose weight\par \par Exercise: following 2 and 5 yo around\par Tries to walk, gets tired and has sciatica. \par \par Smoking since 14 years old, 1/2 ppd \par stopped for 7 months 10 years ago (cold turkey)\par MJ daily

## 2023-04-03 NOTE — CARDIOLOGY SUMMARY
[de-identified] : EKG 4/3/23 Normal sinus rhythm 89 bpm, LAD\par EKG 10/10/22 Normal sinus rhythm 94 bpm, LAD, LVH\par EKG 4/28/22 Normal sinus rhythm 73 bpm, LAD, LVH\par  [de-identified] : Cor CTA 3/21/22 motion degraded exam, multiple segments not reliably evaluated [de-identified] : Regadenoson NST 3/22/22 no evidence of ischemia, nl LV wall motion, LVEF 63%

## 2023-04-03 NOTE — ASSESSMENT
[FreeTextEntry1] : Assessment:\par #CAD\par - CCTA 3/2022 with plaque, severity indeterminate\par - NST 3/2022 negative for ischemia\par #HTN - controlled\par #HLD\par - 3/22/22 , , HDL 38,  \par - 4/7/22 , , HDL 38, LDL 81 on atorva 80 for about 3 weeks\par #T2DM\par - 3/22/22 Hg A1c 6.8%\par #BMI 48\par - 10 lb weight loss since last visit\par #Active smoker\par \par Plan:\par - BROOKS/PVR to screen for PAD given leg pain and risk factors\par - Pulmonary referral for ISATU evaluation \par - Counseled patient on aggressive risk factor reduction with diet, exercise, smoking cessation\par - f/u with dietician\par - Smoking cessation\par - Continue ASA 81 mg daily, atorva 80, lisinopril 40 mg daily\par - Labs prior to next visit: CMP, Hgb A1c, lipid profile, lpa - done 4/1/23 at [facility], results pending\par - Return to clinic in 6 months\par

## 2023-04-03 NOTE — REVIEW OF SYSTEMS
[Dyspnea on exertion] : dyspnea during exertion [Fever] : no fever [Feeling Fatigued] : feeling fatigued [Chest Discomfort] : no chest discomfort [Lower Ext Edema] : no extremity edema [Leg Claudication] : no intermittent leg claudication [Palpitations] : no palpitations [Orthopnea] : no orthopnea [PND] : no PND [Syncope] : no syncope [Cough] : no cough [Dizziness] : no dizziness [FreeTextEntry9] : LLE sciatica limits her ability to exercise

## 2023-04-03 NOTE — PHYSICAL EXAM
[Obese] : obese [No Carotid Bruit] : no carotid bruit [Clear Lung Fields] : clear lung fields [Moves all extremities] : moves all extremities [No Focal Deficits] : no focal deficits [Normal Speech] : normal speech [No edema] : no edema [Present] : present bilaterally [de-identified] : difficult to auscultate heart sounds due to body habitus

## 2023-05-01 ENCOUNTER — APPOINTMENT (OUTPATIENT)
Dept: CARDIOLOGY | Facility: CLINIC | Age: 48
End: 2023-05-01
Payer: MEDICAID

## 2023-05-01 PROCEDURE — 93923 UPR/LXTR ART STDY 3+ LVLS: CPT

## 2023-05-16 ENCOUNTER — APPOINTMENT (OUTPATIENT)
Dept: PULMONOLOGY | Facility: CLINIC | Age: 48
End: 2023-05-16

## 2023-05-26 ENCOUNTER — APPOINTMENT (OUTPATIENT)
Dept: PULMONOLOGY | Facility: CLINIC | Age: 48
End: 2023-05-26
Payer: MEDICAID

## 2023-05-26 VITALS
HEIGHT: 62 IN | DIASTOLIC BLOOD PRESSURE: 78 MMHG | SYSTOLIC BLOOD PRESSURE: 140 MMHG | OXYGEN SATURATION: 95 % | WEIGHT: 260 LBS | HEART RATE: 85 BPM | BODY MASS INDEX: 47.84 KG/M2

## 2023-05-26 DIAGNOSIS — Z86.79 PERSONAL HISTORY OF OTHER DISEASES OF THE CIRCULATORY SYSTEM: ICD-10-CM

## 2023-05-26 DIAGNOSIS — F17.200 NICOTINE DEPENDENCE, UNSPECIFIED, UNCOMPLICATED: ICD-10-CM

## 2023-05-26 DIAGNOSIS — Z87.39 PERSONAL HISTORY OF OTHER DISEASES OF THE MUSCULOSKELETAL SYSTEM AND CONNECTIVE TISSUE: ICD-10-CM

## 2023-05-26 DIAGNOSIS — G47.33 OBSTRUCTIVE SLEEP APNEA (ADULT) (PEDIATRIC): ICD-10-CM

## 2023-05-26 DIAGNOSIS — Z82.49 FAMILY HISTORY OF ISCHEMIC HEART DISEASE AND OTHER DISEASES OF THE CIRCULATORY SYSTEM: ICD-10-CM

## 2023-05-26 DIAGNOSIS — J45.909 UNSPECIFIED ASTHMA, UNCOMPLICATED: ICD-10-CM

## 2023-05-26 DIAGNOSIS — Z86.39 PERSONAL HISTORY OF OTHER ENDOCRINE, NUTRITIONAL AND METABOLIC DISEASE: ICD-10-CM

## 2023-05-26 PROCEDURE — 99204 OFFICE O/P NEW MOD 45 MIN: CPT | Mod: 25

## 2023-05-26 PROCEDURE — 99406 BEHAV CHNG SMOKING 3-10 MIN: CPT

## 2023-05-26 NOTE — REVIEW OF SYSTEMS
[Fatigue] : fatigue [Nocturia] : nocturia [Back Pain] : back pain [Negative] : Endocrine [TextBox_30] : see HPI

## 2023-05-26 NOTE — COUNSELING
[Yes] : Risk of tobacco use and health benefits of smoking cessation discussed: Yes [Cessation strategies including cessation program discussed] : Cessation strategies including cessation program discussed [FreeTextEntry1] : 4

## 2023-05-26 NOTE — HISTORY OF PRESENT ILLNESS
[Initial Evaluation] : an initial evaluation of [Excessive Daytime Sleepiness] : excessive daytime sleepiness [Witnessed Apnea During Sleep] : witnessed apnea during sleep [Witnessed Gasping During Sleep] : witnessed gasping during sleep [Snoring] : snoring [Unrefreshing Sleep] : unrefreshing sleep [Sleepy When Sedentary] : sleepy when sedentary [Irritability] : irritability [Currently Experiencing] : The patient is currently experiencing symptoms. [Obesity] : obesity [Hypertension] : hypertension

## 2023-06-29 ENCOUNTER — APPOINTMENT (OUTPATIENT)
Dept: SLEEP CENTER | Facility: HOSPITAL | Age: 48
End: 2023-06-29

## 2023-07-13 ENCOUNTER — APPOINTMENT (OUTPATIENT)
Dept: PULMONOLOGY | Facility: HOSPITAL | Age: 48
End: 2023-07-13

## 2023-08-14 NOTE — ED PROVIDER NOTE - EKG ADDITIONAL QUESTION - PERFORMED INDEPENDENT VISUALIZATION
Bernie Boyer, RN 8/14/2023 12:42 PM CDT    Dr. Aguilar, I am not sure how you feel about this?     ThanksBernie  ----- Message -----  From: Natacha Pedraza  Sent: 8/14/2023 12:15 PM CDT  To: COCO Aguilar [employer] Message Pool Wa/k/Nb  Subject: I’m curious     I keep getting advertisements for a breast cancer ultrasound screening that’s going to be in [employer] this Thursday and Friday. I believe it’s [email or web address]. Is this an good option for me considering my cousins breast cancer was found using an ultrasound. Or is this not a trustworthy option?    Thanks  Stephanie    
Yes

## 2023-08-22 ENCOUNTER — RX RENEWAL (OUTPATIENT)
Age: 48
End: 2023-08-22

## 2023-08-31 ENCOUNTER — RX RENEWAL (OUTPATIENT)
Age: 48
End: 2023-08-31

## 2023-11-20 ENCOUNTER — APPOINTMENT (OUTPATIENT)
Dept: CARDIOLOGY | Facility: CLINIC | Age: 48
End: 2023-11-20
Payer: MEDICAID

## 2023-11-20 VITALS
HEART RATE: 71 BPM | DIASTOLIC BLOOD PRESSURE: 62 MMHG | BODY MASS INDEX: 48.58 KG/M2 | WEIGHT: 264 LBS | HEIGHT: 62 IN | SYSTOLIC BLOOD PRESSURE: 102 MMHG | OXYGEN SATURATION: 94 %

## 2023-11-20 VITALS — SYSTOLIC BLOOD PRESSURE: 108 MMHG | DIASTOLIC BLOOD PRESSURE: 70 MMHG

## 2023-11-20 DIAGNOSIS — Z72.0 TOBACCO USE: ICD-10-CM

## 2023-11-20 DIAGNOSIS — I25.10 ATHEROSCLEROTIC HEART DISEASE OF NATIVE CORONARY ARTERY W/OUT ANGINA PECTORIS: ICD-10-CM

## 2023-11-20 PROCEDURE — 99214 OFFICE O/P EST MOD 30 MIN: CPT | Mod: 25

## 2023-11-20 PROCEDURE — 93000 ELECTROCARDIOGRAM COMPLETE: CPT

## 2023-11-20 RX ORDER — ATORVASTATIN CALCIUM 80 MG/1
80 TABLET, FILM COATED ORAL
Qty: 90 | Refills: 3 | Status: ACTIVE | COMMUNITY
Start: 2023-03-13 | End: 1900-01-01

## 2023-11-20 RX ORDER — LISINOPRIL 40 MG/1
40 TABLET ORAL
Qty: 30 | Refills: 6 | Status: ACTIVE | COMMUNITY
Start: 2023-03-29 | End: 1900-01-01

## 2024-02-12 ENCOUNTER — APPOINTMENT (OUTPATIENT)
Dept: CARDIOLOGY | Facility: CLINIC | Age: 49
End: 2024-02-12
Payer: MEDICAID

## 2024-02-12 VITALS
HEIGHT: 62 IN | BODY MASS INDEX: 50.24 KG/M2 | HEART RATE: 60 BPM | WEIGHT: 273 LBS | DIASTOLIC BLOOD PRESSURE: 64 MMHG | OXYGEN SATURATION: 95 % | SYSTOLIC BLOOD PRESSURE: 108 MMHG

## 2024-02-12 DIAGNOSIS — E66.01 MORBID (SEVERE) OBESITY DUE TO EXCESS CALORIES: ICD-10-CM

## 2024-02-12 DIAGNOSIS — E78.5 HYPERLIPIDEMIA, UNSPECIFIED: ICD-10-CM

## 2024-02-12 DIAGNOSIS — Z71.89 OTHER SPECIFIED COUNSELING: ICD-10-CM

## 2024-02-12 DIAGNOSIS — I10 ESSENTIAL (PRIMARY) HYPERTENSION: ICD-10-CM

## 2024-02-12 PROCEDURE — 93000 ELECTROCARDIOGRAM COMPLETE: CPT

## 2024-02-12 PROCEDURE — 99214 OFFICE O/P EST MOD 30 MIN: CPT | Mod: 25

## 2024-02-16 PROBLEM — I10 BENIGN ESSENTIAL HYPERTENSION: Status: ACTIVE | Noted: 2022-04-28

## 2024-02-16 PROBLEM — E78.5 DYSLIPIDEMIA: Status: ACTIVE | Noted: 2022-04-28

## 2024-02-16 PROBLEM — E66.01 MORBID OBESITY: Status: ACTIVE | Noted: 2023-05-26

## 2024-02-16 PROBLEM — Z71.89 COUNSELING ON HEALTH PROMOTION AND DISEASE PREVENTION: Status: ACTIVE | Noted: 2022-04-30

## 2024-03-01 NOTE — ADDENDUM
[FreeTextEntry1] : Pt did blood work with PCP 2/19/24: Cr 0.5, K+ 4.4, ALT 11, AST 14 , , HDL 47, LDL 74 on Atorva 80 Lpa 71.1  Hb A1c 6.4

## 2024-03-01 NOTE — REVIEW OF SYSTEMS
[Dyspnea on exertion] : dyspnea during exertion [SOB] : no shortness of breath [Chest Discomfort] : no chest discomfort [Lower Ext Edema] : no extremity edema [Leg Claudication] : no intermittent leg claudication [Palpitations] : no palpitations [Orthopnea] : no orthopnea [PND] : no PND [Syncope] : no syncope [Cough] : no cough [Dizziness] : no dizziness [FreeTextEntry9] : LLE sciatica limits her ability to exercise

## 2024-03-01 NOTE — CARDIOLOGY SUMMARY
[de-identified] : EKG 11/20/23 Normal sinus rhythm 71 bpm, LVH EKG 4/3/23 Normal sinus rhythm 89 bpm, LAD EKG 10/10/22 Normal sinus rhythm 94 bpm, LAD, LVH EKG 4/28/22 Normal sinus rhythm 73 bpm, LAD, LVH EKG 2/12/24: NSR 60bpm, LAD, LVH  [de-identified] : Cor CTA 3/21/22 motion degraded exam, multiple segments not reliably evaluated [de-identified] : Regadenoson NST 3/22/22 no evidence of ischemia, nl LV wall motion, LVEF 63% [de-identified] : BROOKS/PVR 5/1/2023 1. Arterial wall calcification precludes accurate ankle pressures and ABIs. 2. Right: normal TBI (0.85). Normal PVR waveforms. 3. Left: normal TBI (0.87). Normal PVR waveforms.

## 2024-03-01 NOTE — PHYSICAL EXAM
[Well Developed] : well developed [Well Nourished] : well nourished [Obese] : obese [No Carotid Bruit] : no carotid bruit [Clear Lung Fields] : clear lung fields [Moves all extremities] : moves all extremities [No Focal Deficits] : no focal deficits [Normal Speech] : normal speech [No edema] : no edema [Present] : present bilaterally [de-identified] : difficult to auscultate heart sounds due to body habitus

## 2024-03-01 NOTE — HISTORY OF PRESENT ILLNESS
[FreeTextEntry1] : 48F  (no gDM, gHTN, preeclampsia)  with T2DM, HTN, DLD, asthma here for follow-up.   24 with STAN Rene: Pt reports she quit smoking a month ago but then gained weight due to increased appetite, she vapes now. She had only 1 episode of chest discomfort not related to exertion since last visit. Denies SOB or palpitations. Did not do blood work.   23 Pain that shoots down her legs at rest. Leg swelling at end of the day.   No chest pain, shortness of breath, palpitations, lightheadedness/dizziness, pre-syncope/syncope.   Compliant with medications.   Continues to smoke 1 ppd.   4/3/23 Poor quality sleep. +snoring Occasional sharp CP.  Trying to lose weight. Walk distance has improved. No exertional CP.  Losing weight. Eating healthier. Minimal red meat.   Labs done 23. Compliant with medications.   10/10/22 Walking more, every other day or daily 2-3 hours. Some SOB. No chest pain.  Drinking water. Eating smaller portions, baking meat, more vegetables. Less fried foods, eating out. Interested in speaking with a dietician.   Still smoking. More than before due to stress at home. Looking for a new place to live.  Compliant with medications.   22 Went to ED 3/21/22 for chest pain.  /70s, trop negative,  Cor CTA with plaque, but motion degraded.  Nuclear stress test with no ischemia Discharged home on ASA and atorva 80  Feeling okay. No chest pain, shortness of breath, palpitations, lightheadedness/dizziness, pre-syncope/syncope, or lower extremity edema.  Baking more, less fried foods. Trying to lose weight  Exercise: following 2 and 5 yo around Tries to walk, gets tired and has sciatica.   Smoking since 14 years old, 1/2 ppd  stopped for 7 months 10 years ago (cold turkey) MJ daily

## 2024-03-01 NOTE — ASSESSMENT
[FreeTextEntry1] : Assessment: #CAD - CCTA 3/2022 with plaque, severity indeterminate - NST 3/2022 negative for ischemia #HTN - controlled #HLD - 3/22/22 , , HDL 38,  - 4/7/22 , , HDL 38, LDL 81 on atorva 80 for about 3 weeks #T2DM - 3/22/22 Hg A1c 6.8% #BMI 49 #Smoker -Quit recently but vapes now  Plan: - Blood work with PCP scheduled - Counseled patient on aggressive risk factor reduction with diet, exercise - Continue ASA 81 mg daily, atorva 80, lisinopril 40 mg daily - Labs: CMP, Hgb A1c, lipid profile, lpa  - Return to clinic in 6 months or earlier depending on BW results  Missy Rene, DNP, FNP-BC

## 2024-03-10 PROBLEM — Z71.82 EXERCISE COUNSELING: Status: ACTIVE | Noted: 2022-04-30

## 2024-08-19 ENCOUNTER — EMERGENCY (EMERGENCY)
Facility: HOSPITAL | Age: 49
LOS: 0 days | Discharge: HOME | End: 2024-08-19
Attending: EMERGENCY MEDICINE
Payer: MEDICAID

## 2024-08-19 VITALS
WEIGHT: 273.81 LBS | DIASTOLIC BLOOD PRESSURE: 68 MMHG | TEMPERATURE: 98 F | OXYGEN SATURATION: 96 % | SYSTOLIC BLOOD PRESSURE: 95 MMHG | HEIGHT: 62 IN | RESPIRATION RATE: 18 BRPM | HEART RATE: 71 BPM

## 2024-08-19 DIAGNOSIS — M25.461 EFFUSION, RIGHT KNEE: ICD-10-CM

## 2024-08-19 DIAGNOSIS — M25.561 PAIN IN RIGHT KNEE: ICD-10-CM

## 2024-08-19 DIAGNOSIS — M79.89: ICD-10-CM

## 2024-08-19 PROCEDURE — 99284 EMERGENCY DEPT VISIT MOD MDM: CPT

## 2024-08-19 PROCEDURE — 73564 X-RAY EXAM KNEE 4 OR MORE: CPT | Mod: RT

## 2024-08-19 PROCEDURE — 99283 EMERGENCY DEPT VISIT LOW MDM: CPT | Mod: 25

## 2024-08-19 PROCEDURE — 96372 THER/PROPH/DIAG INJ SC/IM: CPT

## 2024-08-19 PROCEDURE — 73564 X-RAY EXAM KNEE 4 OR MORE: CPT | Mod: 26,RT

## 2024-08-19 RX ORDER — PREDNISONE 10 MG/1
1 TABLET ORAL
Qty: 4 | Refills: 0
Start: 2024-08-19 | End: 2024-08-22

## 2024-08-19 RX ORDER — PREDNISONE 10 MG/1
50 TABLET ORAL ONCE
Refills: 0 | Status: COMPLETED | OUTPATIENT
Start: 2024-08-19 | End: 2024-08-19

## 2024-08-19 RX ORDER — KETOROLAC TROMETHAMINE 10 MG
60 TABLET ORAL ONCE
Refills: 0 | Status: DISCONTINUED | OUTPATIENT
Start: 2024-08-19 | End: 2024-08-19

## 2024-08-19 RX ORDER — ACETAMINOPHEN 500 MG
975 TABLET ORAL ONCE
Refills: 0 | Status: COMPLETED | OUTPATIENT
Start: 2024-08-19 | End: 2024-08-19

## 2024-08-19 RX ADMIN — Medication 975 MILLIGRAM(S): at 18:33

## 2024-08-19 RX ADMIN — Medication 60 MILLIGRAM(S): at 18:34

## 2024-08-19 RX ADMIN — PREDNISONE 50 MILLIGRAM(S): 10 TABLET ORAL at 18:33

## 2024-08-19 NOTE — ED PROVIDER NOTE - OBJECTIVE STATEMENT
Patient is a 48 year old female with no significant PMH presenting for knee pain. Patient reports acute onset of atraumatic right knee pain/swelling x1 day that has not improved with medications. Patient states she has similar knee issues in the past; recent MRIs and Duplex studies were negative. Patient denies recent trauma, twisting injuries, chest pain, shortness of breath, nausea, vomiting, abdominal pain, urinary symptoms, leg numbness/weakness, hemoptysis, recent surgery, recent travels, or additional complaints.

## 2024-08-19 NOTE — ED PROVIDER NOTE - NSFOLLOWUPCLINICS_GEN_ALL_ED_FT
[facility]  Orthpedic  [address], [place]   Phone: [phone]  Fax:   Follow Up Time: Routine

## 2024-08-19 NOTE — ED PROCEDURE NOTE - NS ED PERI VASCULAR NEG
English fingers/toes warm to touch/no paresthesia/no swelling/no cyanosis of extremity/capillary refill time < 2 seconds

## 2024-08-19 NOTE — ED PROVIDER NOTE - CLINICAL SUMMARY MEDICAL DECISION MAKING FREE TEXT BOX
Patient presented with atraumatic right knee pain and swelling x 1 day as documented.  Patient has had similar symptoms in the past that have self resolved.  On arrival, patient afebrile, hemodynamically stable, fully neurovascularly intact, full active and passive range of motion of the knee.  Knee is not significantly erythematous, no significant edema, no fluid collection.  No concern for septic joint.  X-ray obtained and negative for underlying bony abnormality aside from arthritic changes.  Pain otherwise controlled in the ED and patient able to ambulate.  Given the above, will discharge home with outpatient follow up. Patient agreeable with plan. Agrees to return to ED for any new or worsening symptoms.

## 2024-08-19 NOTE — ED ADULT NURSE NOTE - OBJECTIVE STATEMENT
49 y/o male presents to ED c/o right knee pain starting yesterday Add 52 Modifier (Optional): no Consent: The patient's consent was obtained including but not limited to risks of crusting, scabbing, blistering, scarring, darker or lighter pigmentary change, recurrence, incomplete removal and infection. Show Topical Anesthesia Variable?: Yes Application Tool (Optional): Liquid Nitrogen Sprayer Medical Necessity Information: It is in your best interest to select a reason for this procedure from the list below. All of these items fulfill various CMS LCD requirements except the new and changing color options. Number Of Freeze-Thaw Cycles: 1 freeze-thaw cycle Medical Necessity Clause: This procedure was medically necessary because the lesions that were treated were: Aperture Size (Optional): C Detail Level: Detailed Post-Care Instructions: I reviewed with the patient in detail post-care instructions. Patient is to wear sunprotection, and avoid picking at any of the treated lesions. Pt may apply Vaseline to crusted or scabbing areas. Duration Of Freeze Thaw-Cycle (Seconds): 5-10 Spray Paint Text: The liquid nitrogen was applied to the skin utilizing a spray paint frosting technique.

## 2024-08-19 NOTE — ED PROVIDER NOTE - PATIENT PORTAL LINK FT
You can access the FollowMyHealth Patient Portal offered by [facility] by registering at the following website: [email or web address]. By joining [employer]’s FollowMyHealth portal, you will also be able to view your health information using other applications (apps) compatible with our system.

## 2024-08-19 NOTE — ED PROVIDER NOTE - PHYSICAL EXAMINATION
VITAL SIGNS: I have reviewed nursing notes and confirm.  CONSTITUTIONAL: Well-appearing, non-toxic, in NAD  SKIN: Warm dry, normal skin turgor  HEAD: NCAT  EYES: No conjunctival injection, scleral anicteric  ENT: Moist mucous membranes, normal pharynx with no erythema or exudates  NECK: Supple; full ROM. Nontender. No cervical LAD  CARD: RRR, no murmurs, rubs or gallops  RESP: Clear to ausculation bilaterally.  No rales, rhonchi, or wheezing.  ABD: Soft, non-distended, non-tender  EXT: Full ROM, right knee swelling, non-erythematous, non-indurated  NEURO: Normal motor, normal sensory. antalgic gait

## 2024-08-19 NOTE — ED PROVIDER NOTE - NSFOLLOWUPINSTRUCTIONS_ED_ALL_ED_FT
Our Emergency Department Referral Coordinators will be reaching out to you in the next 24-48 hours from 9:00am to 5:00pm with a follow up appointment. Please expect a phone call from the hospital in that time frame. If you do not receive a call or if you have any questions or concerns, you can reach them at   [phone]     Knee Pain    Knee pain is a very common symptom and can have many causes. Knee pain often goes away when you follow your health care provider's instructions for relieving pain and discomfort at home. However, knee pain can develop into a condition that needs treatment. Some conditions may include:     Arthritis caused by wear and tear (osteoarthritis).  Arthritis caused by swelling and irritation (rheumatoid arthritis or gout).  A cyst or growth in your knee.  An infection in your knee joint.  An injury that will not heal.  Damage, swelling, or irritation of the tissues that support your knee (torn ligaments or tendinitis).    If your knee pain continues, additional tests may be ordered to diagnose your condition. Tests may include X-rays or other imaging studies of your knee. You may also need to have fluid removed from your knee. Treatment for ongoing knee pain depends on the cause, but treatment may include:    Medicines to relieve pain or swelling.  Steroid injections in your knee.  Physical therapy.  Surgery.    HOME CARE INSTRUCTIONS  Take medicines only as directed by your health care provider.   Rest your knee and keep it raised (elevated) while you are resting.  Do not do things that cause or worsen pain.  Avoid high-impact activities or exercises, such as running, jumping rope, or doing jumping jacks.  Apply ice to the knee area:  Put ice in a plastic bag.  Place a towel between your skin and the bag.  Leave the ice on for 20 minutes, 2–3 times a day.  Ask your health care provider if you should wear an elastic knee support.  Keep a pillow under your knee when you sleep.  Lose weight if you are overweight. Extra weight can put pressure on your knee.  Do not use any tobacco products, including cigarettes, chewing tobacco, or electronic cigarettes. If you need help quitting, ask your health care provider. Smoking may slow the healing of any bone and joint problems that you may have.    SEEK MEDICAL CARE IF:  Your knee pain continues, changes, or gets worse.  You have a fever along with knee pain.  Your knee bay or locks up.  Your knee becomes more swollen.    SEEK IMMEDIATE MEDICAL CARE IF:  Your knee joint feels hot to the touch.  You have chest pain or trouble breathing.

## 2024-08-19 NOTE — ED ADULT TRIAGE NOTE - CHIEF COMPLAINT QUOTE
PT reprots right knee pain starting yesterday. PT has taken oxycodone 10mg, ibuprofen 800mg and gabapentin throughout the day without relief. No trauma noted

## 2024-08-23 ENCOUNTER — APPOINTMENT (OUTPATIENT)
Dept: ORTHOPEDIC SURGERY | Facility: CLINIC | Age: 49
End: 2024-08-23

## 2024-08-23 PROBLEM — Z78.9 OTHER SPECIFIED HEALTH STATUS: Chronic | Status: ACTIVE | Noted: 2024-08-19

## 2024-09-05 ENCOUNTER — APPOINTMENT (OUTPATIENT)
Dept: ORTHOPEDIC SURGERY | Facility: CLINIC | Age: 49
End: 2024-09-05

## 2024-10-03 ENCOUNTER — RX RENEWAL (OUTPATIENT)
Age: 49
End: 2024-10-03

## 2024-10-09 NOTE — ED PROVIDER NOTE - NSFOLLOWUPCLINICSTOKEN_GEN_ALL_ED_FT
[facility] calling  Pharmacist had a question about sig on eye drops  One drop in right eye every 1 hour x 4, then every 2 hours until bed time. Starting tomorrow place one drop in right eye four times a day for 6 days    No questions   536312:Routine|| ||00\01||False;

## 2024-10-25 ENCOUNTER — APPOINTMENT (OUTPATIENT)
Dept: CARDIOLOGY | Facility: CLINIC | Age: 49
End: 2024-10-25
Payer: MEDICAID

## 2024-10-25 VITALS
WEIGHT: 268 LBS | HEART RATE: 68 BPM | HEIGHT: 62 IN | SYSTOLIC BLOOD PRESSURE: 100 MMHG | BODY MASS INDEX: 49.32 KG/M2 | DIASTOLIC BLOOD PRESSURE: 70 MMHG

## 2024-10-25 DIAGNOSIS — I10 ESSENTIAL (PRIMARY) HYPERTENSION: ICD-10-CM

## 2024-10-25 DIAGNOSIS — Z71.3 DIETARY COUNSELING AND SURVEILLANCE: ICD-10-CM

## 2024-10-25 DIAGNOSIS — E66.01 MORBID (SEVERE) OBESITY DUE TO EXCESS CALORIES: ICD-10-CM

## 2024-10-25 DIAGNOSIS — I25.10 ATHEROSCLEROTIC HEART DISEASE OF NATIVE CORONARY ARTERY W/OUT ANGINA PECTORIS: ICD-10-CM

## 2024-10-25 DIAGNOSIS — Z72.0 TOBACCO USE: ICD-10-CM

## 2024-10-25 DIAGNOSIS — E78.5 HYPERLIPIDEMIA, UNSPECIFIED: ICD-10-CM

## 2024-10-25 DIAGNOSIS — R06.83 SNORING: ICD-10-CM

## 2024-10-25 PROCEDURE — 93000 ELECTROCARDIOGRAM COMPLETE: CPT

## 2024-10-25 PROCEDURE — 99214 OFFICE O/P EST MOD 30 MIN: CPT | Mod: 25

## 2024-10-28 LAB
ESTIMATED AVERAGE GLUCOSE: 169 MG/DL
HBA1C MFR BLD HPLC: 7.5 %
HCT VFR BLD CALC: NORMAL %
HGB BLD-MCNC: NORMAL G/DL
MCHC RBC-ENTMCNC: NORMAL G/DL
MCHC RBC-ENTMCNC: NORMAL PG
MCV RBC AUTO: NORMAL FL
PLATELET # BLD AUTO: NORMAL K/UL
PMV BLD AUTO: NORMAL /100 WBCS
PMV BLD: NORMAL FL
RBC # BLD: NORMAL M/UL
RBC # FLD: NORMAL %
WBC # FLD AUTO: NORMAL K/UL

## 2024-10-31 ENCOUNTER — APPOINTMENT (OUTPATIENT)
Dept: CARDIOLOGY | Facility: CLINIC | Age: 49
End: 2024-10-31

## 2024-10-31 VITALS
HEIGHT: 62 IN | HEART RATE: 78 BPM | OXYGEN SATURATION: 95 % | WEIGHT: 259 LBS | SYSTOLIC BLOOD PRESSURE: 108 MMHG | BODY MASS INDEX: 47.66 KG/M2 | DIASTOLIC BLOOD PRESSURE: 70 MMHG

## 2024-10-31 DIAGNOSIS — E11.9 TYPE 2 DIABETES MELLITUS W/OUT COMPLICATIONS: ICD-10-CM

## 2024-10-31 DIAGNOSIS — E78.5 HYPERLIPIDEMIA, UNSPECIFIED: ICD-10-CM

## 2024-10-31 DIAGNOSIS — E66.01 MORBID (SEVERE) OBESITY DUE TO EXCESS CALORIES: ICD-10-CM

## 2024-10-31 DIAGNOSIS — Z71.3 DIETARY COUNSELING AND SURVEILLANCE: ICD-10-CM

## 2024-10-31 DIAGNOSIS — I25.10 ATHEROSCLEROTIC HEART DISEASE OF NATIVE CORONARY ARTERY W/OUT ANGINA PECTORIS: ICD-10-CM

## 2024-10-31 PROCEDURE — 99214 OFFICE O/P EST MOD 30 MIN: CPT

## 2024-11-04 ENCOUNTER — LABORATORY RESULT (OUTPATIENT)
Age: 49
End: 2024-11-04

## 2024-11-06 ENCOUNTER — EMERGENCY (EMERGENCY)
Facility: HOSPITAL | Age: 49
LOS: 0 days | Discharge: HOME | End: 2024-11-06
Attending: STUDENT IN AN ORGANIZED HEALTH CARE EDUCATION/TRAINING PROGRAM
Payer: MEDICAID

## 2024-11-06 VITALS
TEMPERATURE: 98 F | HEIGHT: 62 IN | OXYGEN SATURATION: 99 % | RESPIRATION RATE: 18 BRPM | SYSTOLIC BLOOD PRESSURE: 109 MMHG | DIASTOLIC BLOOD PRESSURE: 83 MMHG | HEART RATE: 77 BPM | WEIGHT: 259.04 LBS

## 2024-11-06 VITALS
SYSTOLIC BLOOD PRESSURE: 116 MMHG | HEART RATE: 64 BPM | RESPIRATION RATE: 18 BRPM | TEMPERATURE: 98 F | DIASTOLIC BLOOD PRESSURE: 65 MMHG | OXYGEN SATURATION: 99 %

## 2024-11-06 DIAGNOSIS — J45.909 UNSPECIFIED ASTHMA, UNCOMPLICATED: ICD-10-CM

## 2024-11-06 DIAGNOSIS — F17.200 NICOTINE DEPENDENCE, UNSPECIFIED, UNCOMPLICATED: ICD-10-CM

## 2024-11-06 DIAGNOSIS — R07.2 PRECORDIAL PAIN: ICD-10-CM

## 2024-11-06 DIAGNOSIS — E78.5 HYPERLIPIDEMIA, UNSPECIFIED: ICD-10-CM

## 2024-11-06 DIAGNOSIS — R29.810 FACIAL WEAKNESS: ICD-10-CM

## 2024-11-06 DIAGNOSIS — I10 ESSENTIAL (PRIMARY) HYPERTENSION: ICD-10-CM

## 2024-11-06 DIAGNOSIS — E11.9 TYPE 2 DIABETES MELLITUS WITHOUT COMPLICATIONS: ICD-10-CM

## 2024-11-06 DIAGNOSIS — R53.1 WEAKNESS: ICD-10-CM

## 2024-11-06 DIAGNOSIS — G89.29 OTHER CHRONIC PAIN: ICD-10-CM

## 2024-11-06 LAB
ALBUMIN SERPL ELPH-MCNC: 4.5 G/DL — SIGNIFICANT CHANGE UP (ref 3.5–5.2)
ALP SERPL-CCNC: 115 U/L — SIGNIFICANT CHANGE UP (ref 30–115)
ALT FLD-CCNC: 13 U/L — SIGNIFICANT CHANGE UP (ref 0–41)
ANION GAP SERPL CALC-SCNC: 10 MMOL/L — SIGNIFICANT CHANGE UP (ref 7–14)
APTT BLD: 21.3 SEC — CRITICAL LOW (ref 27–39.2)
AST SERPL-CCNC: 14 U/L — SIGNIFICANT CHANGE UP (ref 0–41)
BASOPHILS # BLD AUTO: 0.05 K/UL — SIGNIFICANT CHANGE UP (ref 0–0.2)
BASOPHILS NFR BLD AUTO: 0.5 % — SIGNIFICANT CHANGE UP (ref 0–1)
BILIRUB SERPL-MCNC: 0.2 MG/DL — SIGNIFICANT CHANGE UP (ref 0.2–1.2)
BUN SERPL-MCNC: 18 MG/DL — SIGNIFICANT CHANGE UP (ref 10–20)
CALCIUM SERPL-MCNC: 9.3 MG/DL — SIGNIFICANT CHANGE UP (ref 8.4–10.5)
CHLORIDE SERPL-SCNC: 104 MMOL/L — SIGNIFICANT CHANGE UP (ref 98–110)
CO2 SERPL-SCNC: 27 MMOL/L — SIGNIFICANT CHANGE UP (ref 17–32)
CREAT SERPL-MCNC: 0.9 MG/DL — SIGNIFICANT CHANGE UP (ref 0.7–1.5)
EGFR: 79 ML/MIN/1.73M2 — SIGNIFICANT CHANGE UP
EOSINOPHIL # BLD AUTO: 0.13 K/UL — SIGNIFICANT CHANGE UP (ref 0–0.7)
EOSINOPHIL NFR BLD AUTO: 1.2 % — SIGNIFICANT CHANGE UP (ref 0–8)
GLUCOSE SERPL-MCNC: 165 MG/DL — HIGH (ref 70–99)
HCG SERPL QL: NEGATIVE — SIGNIFICANT CHANGE UP
HCT VFR BLD CALC: 44.8 % — SIGNIFICANT CHANGE UP (ref 37–47)
HGB BLD-MCNC: 15 G/DL — SIGNIFICANT CHANGE UP (ref 12–16)
IMM GRANULOCYTES NFR BLD AUTO: 0.3 % — SIGNIFICANT CHANGE UP (ref 0.1–0.3)
INR BLD: 0.87 RATIO — SIGNIFICANT CHANGE UP (ref 0.65–1.3)
LYMPHOCYTES # BLD AUTO: 3.78 K/UL — HIGH (ref 1.2–3.4)
LYMPHOCYTES # BLD AUTO: 35.6 % — SIGNIFICANT CHANGE UP (ref 20.5–51.1)
MAGNESIUM SERPL-MCNC: 1.8 MG/DL — SIGNIFICANT CHANGE UP (ref 1.8–2.4)
MCHC RBC-ENTMCNC: 32.3 PG — HIGH (ref 27–31)
MCHC RBC-ENTMCNC: 33.5 G/DL — SIGNIFICANT CHANGE UP (ref 32–37)
MCV RBC AUTO: 96.6 FL — SIGNIFICANT CHANGE UP (ref 81–99)
MONOCYTES # BLD AUTO: 0.49 K/UL — SIGNIFICANT CHANGE UP (ref 0.1–0.6)
MONOCYTES NFR BLD AUTO: 4.6 % — SIGNIFICANT CHANGE UP (ref 1.7–9.3)
NEUTROPHILS # BLD AUTO: 6.14 K/UL — SIGNIFICANT CHANGE UP (ref 1.4–6.5)
NEUTROPHILS NFR BLD AUTO: 57.8 % — SIGNIFICANT CHANGE UP (ref 42.2–75.2)
NRBC # BLD: 0 /100 WBCS — SIGNIFICANT CHANGE UP (ref 0–0)
NT-PROBNP SERPL-SCNC: <36 PG/ML — SIGNIFICANT CHANGE UP (ref 0–300)
PLATELET # BLD AUTO: 234 K/UL — SIGNIFICANT CHANGE UP (ref 130–400)
PMV BLD: 10.4 FL — SIGNIFICANT CHANGE UP (ref 7.4–10.4)
POTASSIUM SERPL-MCNC: 4 MMOL/L — SIGNIFICANT CHANGE UP (ref 3.5–5)
POTASSIUM SERPL-SCNC: 4 MMOL/L — SIGNIFICANT CHANGE UP (ref 3.5–5)
PROT SERPL-MCNC: 6.9 G/DL — SIGNIFICANT CHANGE UP (ref 6–8)
PROTHROM AB SERPL-ACNC: 10.2 SEC — SIGNIFICANT CHANGE UP (ref 9.95–12.87)
RBC # BLD: 4.64 M/UL — SIGNIFICANT CHANGE UP (ref 4.2–5.4)
RBC # FLD: 12 % — SIGNIFICANT CHANGE UP (ref 11.5–14.5)
SODIUM SERPL-SCNC: 141 MMOL/L — SIGNIFICANT CHANGE UP (ref 135–146)
TROPONIN T, HIGH SENSITIVITY RESULT: <6 NG/L — SIGNIFICANT CHANGE UP (ref 6–13)
TROPONIN T, HIGH SENSITIVITY RESULT: <6 NG/L — SIGNIFICANT CHANGE UP (ref 6–13)
WBC # BLD: 10.62 K/UL — SIGNIFICANT CHANGE UP (ref 4.8–10.8)
WBC # FLD AUTO: 10.62 K/UL — SIGNIFICANT CHANGE UP (ref 4.8–10.8)

## 2024-11-06 PROCEDURE — 93005 ELECTROCARDIOGRAM TRACING: CPT

## 2024-11-06 PROCEDURE — 96374 THER/PROPH/DIAG INJ IV PUSH: CPT | Mod: XU

## 2024-11-06 PROCEDURE — 36415 COLL VENOUS BLD VENIPUNCTURE: CPT

## 2024-11-06 PROCEDURE — 70450 CT HEAD/BRAIN W/O DYE: CPT | Mod: MC

## 2024-11-06 PROCEDURE — 93017 CV STRESS TEST TRACING ONLY: CPT

## 2024-11-06 PROCEDURE — 70496 CT ANGIOGRAPHY HEAD: CPT | Mod: 26,MC,59

## 2024-11-06 PROCEDURE — 0042T: CPT | Mod: MC

## 2024-11-06 PROCEDURE — 70450 CT HEAD/BRAIN W/O DYE: CPT | Mod: 26,MC

## 2024-11-06 PROCEDURE — G0378: CPT

## 2024-11-06 PROCEDURE — 78452 HT MUSCLE IMAGE SPECT MULT: CPT | Mod: 26,MC

## 2024-11-06 PROCEDURE — 78452 HT MUSCLE IMAGE SPECT MULT: CPT | Mod: MC

## 2024-11-06 PROCEDURE — 99053 MED SERV 10PM-8AM 24 HR FAC: CPT

## 2024-11-06 PROCEDURE — 93010 ELECTROCARDIOGRAM REPORT: CPT | Mod: 77

## 2024-11-06 PROCEDURE — 70551 MRI BRAIN STEM W/O DYE: CPT | Mod: 26,MC

## 2024-11-06 PROCEDURE — A9500: CPT

## 2024-11-06 PROCEDURE — 85730 THROMBOPLASTIN TIME PARTIAL: CPT

## 2024-11-06 PROCEDURE — 82962 GLUCOSE BLOOD TEST: CPT

## 2024-11-06 PROCEDURE — 71045 X-RAY EXAM CHEST 1 VIEW: CPT | Mod: 26

## 2024-11-06 PROCEDURE — 70551 MRI BRAIN STEM W/O DYE: CPT | Mod: MC

## 2024-11-06 PROCEDURE — 84703 CHORIONIC GONADOTROPIN ASSAY: CPT

## 2024-11-06 PROCEDURE — 99236 HOSP IP/OBS SAME DATE HI 85: CPT

## 2024-11-06 PROCEDURE — 70498 CT ANGIOGRAPHY NECK: CPT | Mod: MC

## 2024-11-06 PROCEDURE — 93010 ELECTROCARDIOGRAM REPORT: CPT

## 2024-11-06 PROCEDURE — 93016 CV STRESS TEST SUPVJ ONLY: CPT

## 2024-11-06 PROCEDURE — 70498 CT ANGIOGRAPHY NECK: CPT | Mod: 26,MC

## 2024-11-06 PROCEDURE — 99282 EMERGENCY DEPT VISIT SF MDM: CPT

## 2024-11-06 PROCEDURE — 71045 X-RAY EXAM CHEST 1 VIEW: CPT

## 2024-11-06 PROCEDURE — 84484 ASSAY OF TROPONIN QUANT: CPT

## 2024-11-06 PROCEDURE — 70496 CT ANGIOGRAPHY HEAD: CPT | Mod: MC

## 2024-11-06 PROCEDURE — 93018 CV STRESS TEST I&R ONLY: CPT

## 2024-11-06 PROCEDURE — 85610 PROTHROMBIN TIME: CPT

## 2024-11-06 PROCEDURE — 83880 ASSAY OF NATRIURETIC PEPTIDE: CPT

## 2024-11-06 PROCEDURE — 80053 COMPREHEN METABOLIC PANEL: CPT

## 2024-11-06 PROCEDURE — 83735 ASSAY OF MAGNESIUM: CPT

## 2024-11-06 PROCEDURE — 85025 COMPLETE CBC W/AUTO DIFF WBC: CPT

## 2024-11-06 PROCEDURE — 99291 CRITICAL CARE FIRST HOUR: CPT | Mod: 25

## 2024-11-06 RX ORDER — ASPIRIN/MAG CARB/ALUMINUM AMIN 325 MG
81 TABLET ORAL DAILY
Refills: 0 | Status: DISCONTINUED | OUTPATIENT
Start: 2024-11-06 | End: 2024-11-06

## 2024-11-06 RX ORDER — REGADENOSON 0.08 MG/ML
0.4 INJECTION, SOLUTION INTRAVENOUS ONCE
Refills: 0 | Status: DISCONTINUED | OUTPATIENT
Start: 2024-11-06 | End: 2024-11-06

## 2024-11-06 RX ORDER — CLOPIDOGREL 75 MG/1
1 TABLET ORAL
Qty: 21 | Refills: 0
Start: 2024-11-06 | End: 2024-11-26

## 2024-11-06 RX ORDER — GABAPENTIN 300 MG/1
400 CAPSULE ORAL THREE TIMES A DAY
Refills: 0 | Status: DISCONTINUED | OUTPATIENT
Start: 2024-11-06 | End: 2024-11-06

## 2024-11-06 RX ORDER — CLOPIDOGREL 75 MG/1
300 TABLET ORAL ONCE
Refills: 0 | Status: COMPLETED | OUTPATIENT
Start: 2024-11-06 | End: 2024-11-06

## 2024-11-06 RX ORDER — ASPIRIN/MAG CARB/ALUMINUM AMIN 325 MG
1 TABLET ORAL
Qty: 21 | Refills: 0
Start: 2024-11-06 | End: 2024-11-26

## 2024-11-06 RX ORDER — LISINOPRIL 40 MG
20 TABLET ORAL DAILY
Refills: 0 | Status: DISCONTINUED | OUTPATIENT
Start: 2024-11-06 | End: 2024-11-06

## 2024-11-06 RX ORDER — LORAZEPAM 2 MG
1 TABLET ORAL ONCE
Refills: 0 | Status: DISCONTINUED | OUTPATIENT
Start: 2024-11-06 | End: 2024-11-06

## 2024-11-06 RX ORDER — ASPIRIN/MAG CARB/ALUMINUM AMIN 325 MG
1 TABLET ORAL
Qty: 30 | Refills: 0
Start: 2024-11-06 | End: 2024-12-05

## 2024-11-06 RX ADMIN — GABAPENTIN 400 MILLIGRAM(S): 300 CAPSULE ORAL at 06:05

## 2024-11-06 RX ADMIN — Medication 1 MILLIGRAM(S): at 08:12

## 2024-11-06 RX ADMIN — CLOPIDOGREL 300 MILLIGRAM(S): 75 TABLET ORAL at 06:04

## 2024-11-06 RX ADMIN — Medication 20 MILLIGRAM(S): at 06:04

## 2024-11-06 NOTE — ED CDU PROVIDER DISPOSITION NOTE - PATIENT PORTAL LINK FT
You can access the FollowMyHealth Patient Portal offered by [facility] by registering at the following website: [email or web address]. By joining [employer]’s FollowMyHealth portal, you will also be able to view your health information using other applications (apps) compatible with our system.

## 2024-11-06 NOTE — ED CDU PROVIDER INITIAL DAY NOTE - PHYSICAL EXAMINATION
VITAL SIGNS: I have reviewed nursing notes and confirm.  CONSTITUTIONAL: 47 yo F laying on stretcher; in no acute distress.  SKIN: Skin exam is warm and dry, no acute rash.  HEAD: Normocephalic; atraumatic.  EYES: PERRL, EOM intact; conjunctiva and sclera clear.  ENT: No nasal discharge; airway clear.   NECK: Supple; non tender.  CARD: S1, S2 normal; no murmurs, gallops, or rubs. Regular rate and rhythm.  RESP: No wheezes, rales or rhonchi. Speaking in full sentences.   ABD: Normal bowel sounds; soft; non-distended; non-tender; No rebound or guarding. No CVA tenderness.  EXT: Normal ROM. No clubbing, cyanosis or edema. No calf TTP or swelling.   NEURO: Alert, oriented. Grossly unremarkable. No focal deficits.

## 2024-11-06 NOTE — ED CDU PROVIDER DISPOSITION NOTE - NSFOLLOWUPINSTRUCTIONS_ED_ALL_ED_FT
Please follow up with your primary care doctor 1-3 days  Please follow up with cardiology 4-7 days  Please follow up with neurology 4-37 days     Transient Ischemic Attack  A transient ischemic attack (TIA) happens when blood supply to the brain is blocked temporarily. A TIA causes stroke-like symptoms that go away quickly without causing any permanent damage. Having a TIA can be considered a warning sign for a stroke and should not be ignored. A person who has a TIA is at higher risk for a stroke.    What are the causes?  This condition is caused by a temporary blockage in an artery in the head or neck. This means the brain does not get the blood supply it needs. A blockage can be caused by:  Fatty buildup in an artery in the head or neck (atherosclerosis).  A blood clot traveling from the heart.  An artery tear (dissection).  Inflammation of an artery (vasculitis).  Sometimes the cause is not known.    What increases the risk?  Certain factors make you more likely to develop this condition. Some of these are things you can change, including:  Using products that contain nicotine or tobacco.  Being inactive.  Heavy alcohol use.  Drug use, especially cocaine and methamphetamine.  Medical conditions that may increase your risk include:  High blood pressure (hypertension).  High cholesterol.  Diabetes.  Heart disease (coronary artery disease).  An irregular heartbeat, also called atrial fibrillation (AFib).  Sickle cell disease.  Blood clotting disorders (hypercoagulable state).  Other risk factors include:  Being over the age of 60.  Being male.  Obesity.  Sleep problems such as sleep apnea.  Family history of stroke.  Previous history of blood clots, stroke, TIA, or heart attack.  What are the signs or symptoms?  A normal face next to a face that is drooping on one side due to weakness from a transient ischemic attack.  Symptoms of a TIA are the same as those of a stroke. The symptoms develop suddenly, and then go away quickly. They may include:  Dizziness, loss of balance and coordination, or trouble walking.  Vision changes, such as double vision, blurred vision, or loss of vision.  Weakness or numbness in your face, arm, or leg, especially on one side of your body.  Trouble speaking, understanding speech, or both (aphasia).  Nausea and vomiting.  Severe headache.  Confusion.  If possible, note what time your symptoms started. Tell your health care provider.    How is this diagnosed?  This condition may be diagnosed based on:  Your symptoms and medical history.  A physical exam.  Imaging tests, usually a CT scan or MRI of the brain.  Blood tests.  You may also have other tests, including:  Electrocardiogram (ECG).  Echocardiogram.  Continuous heart monitoring.  Carotid ultrasound.  A scan of blood circulation in the brain (CT angiogram or MR angiogram).  How is this treated?  The goal of treatment is to reduce the risk for a stroke. Stroke prevention therapies may include:  Changes to diet and lifestyle, such as being physically active and stopping smoking.  Treating other health conditions, such as diabetes or AFib.  Medicines to thin the blood (antiplatelets or anticoagulants).  Blood pressure medicines.  Medicines to reduce cholesterol.  If testing shows a narrowing in the arteries to your brain, your health care provider may recommend a procedure, such as:  Carotid endarterectomy. This is done to remove the blockage from your artery.  Carotid angioplasty and stenting. This uses a small mesh tube (stent) to open or widen an artery in the neck. The stent helps keep the artery open by supporting the artery walls.  Follow these instructions at home:  Medicines    Take over-the-counter and prescription medicines only as told by your health care provider.  If you were told to take a medicine to thin your blood, such as aspirin or an anticoagulant, use it exactly as told by your health care provider.  Taking too much blood-thinning medicine can cause bleeding. Taking too little will not protect you against a stroke and other problems.  Eating and drinking    A plate with examples of foods in a healthy diet.  Eat 5 or more servings of fruits and vegetables each day.  Follow guidelines from your health care provider about your diet. You may need to follow a certain diet to help manage risk factors for stroke. This may include:  Eating a low-fat, low-salt diet.  Choosing high-fiber foods.  Limiting carbohydrates and sugar.  If you drink alcohol:  Limit how much you have to:  0–1 drink a day for women who are not pregnant.  0–2 drinks a day for men.  Know how much alcohol is in your drink. In the [place] one drink equals one 12 oz bottle of beer (355 mL), one 5 oz glass of wine (148 mL), or one 1½ oz glass of hard liquor (44 mL).  General instructions    Maintain a healthy weight.  Try to get at least 30 minutes of exercise on most days.  Get treatment if you have sleep apnea.  Do not use any products that contain nicotine or tobacco. These products include cigarettes, chewing tobacco, and vaping devices, such as e-cigarettes. If you need help quitting, ask your health care provider.  Do not use illegal drugs.  Keep all follow-up visits. Your health care provider will want to know if you have any more symptoms and to check blood labs if any medicines were prescribed.  Where to find more information  American Stroke Association: stroke.org  Get help right away if:  You have chest pain.  You have fast or irregular heartbeats (palpitations).  You have any symptoms of a stroke. "BE FAST" is an easy way to remember the main warning signs of a stroke.  B - Balance. Signs are dizziness, sudden trouble walking, or loss of balance.  E - Eyes. Signs are trouble seeing or a sudden change in vision.  F - Face. Signs are sudden weakness or numbness of the face, or the face or eyelid drooping on one side.  A - Arms. Signs are weakness or numbness in an arm. This happens suddenly and usually on one side of the body.  S - Speech.Signs are sudden trouble speaking, slurred speech, or trouble understanding what people say.  T - Time. Time to call emergency services. Write down what time symptoms started.  You have other signs of a stroke, such as:  A sudden, severe headache with no known cause.  Nausea or vomiting.  Seizure.  These symptoms may be an emergency. Get help right away. Call 911.  Do not wait to see if the symptoms will go away.  Do not drive yourself to the hospital.  This information is not intended to replace advice given to you by your health care provider. Make sure you discuss any questions you have with your health care provider.      Chest Pain    Chest pain can be caused by many different conditions which may or may not be dangerous. Causes include heartburn, lung infections, heart attack, blood clot in lungs, skin infections, strain or damage to muscle, cartilage, or bones, etc. In addition to a history and physical examination, an electrocardiogram (ECG) or other lab tests may have been performed to determine the cause of your chest pain. Follow up with your primary care provider or with a cardiologist as instructed.     SEEK IMMEDIATE MEDICAL CARE IF YOU HAVE ANY OF THE FOLLOWING SYMPTOMS: worsening chest pain, coughing up blood, unexplained back/neck/jaw pain, severe abdominal pain, dizziness or lightheadedness, fainting, shortness of breath, sweaty or clammy skin, vomiting, or racing heart beat. These symptoms may represent a serious problem that is an emergency. Do not wait to see if the symptoms will go away. Get medical help right away. Call 911 and do not drive yourself to the hospital. Please follow up with your primary care doctor 1-3 days  Please follow up with cardiology 4-7 days  Please follow up with neurology 4-7 days     Transient Ischemic Attack  A transient ischemic attack (TIA) happens when blood supply to the brain is blocked temporarily. A TIA causes stroke-like symptoms that go away quickly without causing any permanent damage. Having a TIA can be considered a warning sign for a stroke and should not be ignored. A person who has a TIA is at higher risk for a stroke.    What are the causes?  This condition is caused by a temporary blockage in an artery in the head or neck. This means the brain does not get the blood supply it needs. A blockage can be caused by:  Fatty buildup in an artery in the head or neck (atherosclerosis).  A blood clot traveling from the heart.  An artery tear (dissection).  Inflammation of an artery (vasculitis).  Sometimes the cause is not known.    What increases the risk?  Certain factors make you more likely to develop this condition. Some of these are things you can change, including:  Using products that contain nicotine or tobacco.  Being inactive.  Heavy alcohol use.  Drug use, especially cocaine and methamphetamine.  Medical conditions that may increase your risk include:  High blood pressure (hypertension).  High cholesterol.  Diabetes.  Heart disease (coronary artery disease).  An irregular heartbeat, also called atrial fibrillation (AFib).  Sickle cell disease.  Blood clotting disorders (hypercoagulable state).  Other risk factors include:  Being over the age of 60.  Being male.  Obesity.  Sleep problems such as sleep apnea.  Family history of stroke.  Previous history of blood clots, stroke, TIA, or heart attack.  What are the signs or symptoms?  A normal face next to a face that is drooping on one side due to weakness from a transient ischemic attack.  Symptoms of a TIA are the same as those of a stroke. The symptoms develop suddenly, and then go away quickly. They may include:  Dizziness, loss of balance and coordination, or trouble walking.  Vision changes, such as double vision, blurred vision, or loss of vision.  Weakness or numbness in your face, arm, or leg, especially on one side of your body.  Trouble speaking, understanding speech, or both (aphasia).  Nausea and vomiting.  Severe headache.  Confusion.  If possible, note what time your symptoms started. Tell your health care provider.    How is this diagnosed?  This condition may be diagnosed based on:  Your symptoms and medical history.  A physical exam.  Imaging tests, usually a CT scan or MRI of the brain.  Blood tests.  You may also have other tests, including:  Electrocardiogram (ECG).  Echocardiogram.  Continuous heart monitoring.  Carotid ultrasound.  A scan of blood circulation in the brain (CT angiogram or MR angiogram).  How is this treated?  The goal of treatment is to reduce the risk for a stroke. Stroke prevention therapies may include:  Changes to diet and lifestyle, such as being physically active and stopping smoking.  Treating other health conditions, such as diabetes or AFib.  Medicines to thin the blood (antiplatelets or anticoagulants).  Blood pressure medicines.  Medicines to reduce cholesterol.  If testing shows a narrowing in the arteries to your brain, your health care provider may recommend a procedure, such as:  Carotid endarterectomy. This is done to remove the blockage from your artery.  Carotid angioplasty and stenting. This uses a small mesh tube (stent) to open or widen an artery in the neck. The stent helps keep the artery open by supporting the artery walls.  Follow these instructions at home:  Medicines    Take over-the-counter and prescription medicines only as told by your health care provider.  If you were told to take a medicine to thin your blood, such as aspirin or an anticoagulant, use it exactly as told by your health care provider.  Taking too much blood-thinning medicine can cause bleeding. Taking too little will not protect you against a stroke and other problems.  Eating and drinking    A plate with examples of foods in a healthy diet.  Eat 5 or more servings of fruits and vegetables each day.  Follow guidelines from your health care provider about your diet. You may need to follow a certain diet to help manage risk factors for stroke. This may include:  Eating a low-fat, low-salt diet.  Choosing high-fiber foods.  Limiting carbohydrates and sugar.  If you drink alcohol:  Limit how much you have to:  0–1 drink a day for women who are not pregnant.  0–2 drinks a day for men.  Know how much alcohol is in your drink. In the [place] one drink equals one 12 oz bottle of beer (355 mL), one 5 oz glass of wine (148 mL), or one 1½ oz glass of hard liquor (44 mL).  General instructions    Maintain a healthy weight.  Try to get at least 30 minutes of exercise on most days.  Get treatment if you have sleep apnea.  Do not use any products that contain nicotine or tobacco. These products include cigarettes, chewing tobacco, and vaping devices, such as e-cigarettes. If you need help quitting, ask your health care provider.  Do not use illegal drugs.  Keep all follow-up visits. Your health care provider will want to know if you have any more symptoms and to check blood labs if any medicines were prescribed.  Where to find more information  American Stroke Association: stroke.org  Get help right away if:  You have chest pain.  You have fast or irregular heartbeats (palpitations).  You have any symptoms of a stroke. "BE FAST" is an easy way to remember the main warning signs of a stroke.  B - Balance. Signs are dizziness, sudden trouble walking, or loss of balance.  E - Eyes. Signs are trouble seeing or a sudden change in vision.  F - Face. Signs are sudden weakness or numbness of the face, or the face or eyelid drooping on one side.  A - Arms. Signs are weakness or numbness in an arm. This happens suddenly and usually on one side of the body.  S - Speech.Signs are sudden trouble speaking, slurred speech, or trouble understanding what people say.  T - Time. Time to call emergency services. Write down what time symptoms started.  You have other signs of a stroke, such as:  A sudden, severe headache with no known cause.  Nausea or vomiting.  Seizure.  These symptoms may be an emergency. Get help right away. Call 911.  Do not wait to see if the symptoms will go away.  Do not drive yourself to the hospital.  This information is not intended to replace advice given to you by your health care provider. Make sure you discuss any questions you have with your health care provider.      Chest Pain    Chest pain can be caused by many different conditions which may or may not be dangerous. Causes include heartburn, lung infections, heart attack, blood clot in lungs, skin infections, strain or damage to muscle, cartilage, or bones, etc. In addition to a history and physical examination, an electrocardiogram (ECG) or other lab tests may have been performed to determine the cause of your chest pain. Follow up with your primary care provider or with a cardiologist as instructed.     SEEK IMMEDIATE MEDICAL CARE IF YOU HAVE ANY OF THE FOLLOWING SYMPTOMS: worsening chest pain, coughing up blood, unexplained back/neck/jaw pain, severe abdominal pain, dizziness or lightheadedness, fainting, shortness of breath, sweaty or clammy skin, vomiting, or racing heart beat. These symptoms may represent a serious problem that is an emergency. Do not wait to see if the symptoms will go away. Get medical help right away. Call 911 and do not drive yourself to the hospital.

## 2024-11-06 NOTE — ED PROVIDER NOTE - PHYSICAL EXAMINATION
VITAL SIGNS: I have reviewed nursing notes and confirm.  CONSTITUTIONAL: 49 yo F laying on stretcher; in no acute distress.  SKIN: Skin exam is warm and dry, no acute rash.  HEAD: Normocephalic; atraumatic.  EYES: PERRL, EOM intact; conjunctiva and sclera clear.  ENT: No nasal discharge; airway clear.   NECK: Supple; non tender.  CARD: S1, S2 normal; no murmurs, gallops, or rubs. Regular rate and rhythm.  RESP: No wheezes, rales or rhonchi. Speaking in full sentences.   ABD: Normal bowel sounds; soft; non-distended; non-tender; No rebound or guarding. No CVA tenderness.  EXT: Normal ROM. No clubbing, cyanosis or edema. No calf TTP or swelling.   NEURO: Alert, oriented. Grossly unremarkable. No focal deficits.

## 2024-11-06 NOTE — ED PROVIDER NOTE - OBJECTIVE STATEMENT
48-year-old female with past medical history of HTN, HLD, DM, asthma, and chronic back pain 2/2 herniated discs presents to the ED complaining of substernal chest pain and right facial droop with associated left upper extremity weakness that started around 11:45 PM.  Cording to the patient and her son they were involved in a heated conversation on the phone, she started to feel symptoms and walked into her son's room and asked them to look at her face and he noted to be drooping on the right side.  At that time patient was having trouble squeezing and moving her left arm and hand as well.  Upon ED arrival symptoms have mostly resolved. Pt denies fever, chills, nausea, vomiting, abdominal pain, diarrhea, headache, dizziness, SOB, back pain, LOC, trauma, urinary symptoms, cough, calf pain/swelling, recent travel, recent surgery.

## 2024-11-06 NOTE — ED PROVIDER NOTE - PROGRESS NOTE DETAILS
stroke code called overhead given complaints (although technically symptoms improving and likely more TIA) pt's last stress tests (CCTA and nuclear stress) in 2022. Pt had increased calcium to LAD, with resulting CADS-RAD 4, and an essentially normal Lexiscan with normal EF. At present pt reevaluated by neuro and NIH score went from 1 to 0. Awaiting reads on CTA for neuro final recommendations.

## 2024-11-06 NOTE — ED PROVIDER NOTE - NSTIMEPROVIDERCAREINITIATE_GEN_ER
You can access the FollowMyHealth Patient Portal offered by [facility] by registering at the following website: [email or web address]. By joining [employer]’s FollowMyHealth portal, you will also be able to view your health information using other applications (apps) compatible with our system. 06-Nov-2024 00:49

## 2024-11-06 NOTE — ED CDU PROVIDER INITIAL DAY NOTE - ATTENDING APP SHARED VISIT CONTRIBUTION OF CARE
48-year-old female with past medical history of obesity, HTN, HLD, DM, asthma, chronic back problems with herniated disks, tubal ligation, gallstone removal (not gallbladder removal) presents to the ED for complaint of  mid sternal chest pain and  right facial droop associated with left upper extremity weakness at around 11:45 PM. In the ED stroke code activated. Ct's performed CT PERFUSION:  Small region of hypoperfusion (5 mL) in the right temporal lobe, likely artifactual. No core infarct or ischemic penumbra.CTA HEAD/NECK:No vessel occlusion or hemodynamically significant stenosis. No evidence of dissection in the neck. No intracranial vascular malformation. High sensitivity troponin <6, and <6. Patient placed in observation for MRI and cardiac workup. Patient evaluated this morning No acute complaints weakness resolved. However states has severe anxiety with performing MRIS and usually has them performed in an open mri. States with try with an anxiolytic.  Plan for MRI & Stress test.

## 2024-11-06 NOTE — ED ADULT TRIAGE NOTE - CHIEF COMPLAINT QUOTE
BIBA from home complaining of chest discomfort after arguing with family members. Patient ambulatory in triage.

## 2024-11-06 NOTE — ED PROVIDER NOTE - CRITICAL CARE ATTENDING CONTRIBUTION TO CARE
I personally saw the patient. DAVEY Mahmood and I provided critical care for a total of 45 minutes. I provided a substantive portion of the care and the majority of the critical care time.    48-year-old female with past medical history of obesity, HTN, HLD, DM, asthma, chronic back problems with herniated disks, tubal ligation, gallstone removal (not gallbladder removal) presents to the ED for complaint of (1) mid sternal chest pain and (2) right facial droop associated with left upper extremity weakness at around 11:45 PM.  Patient here with her son, they state that she was having a heated conversation on the phone, she started to feel symptoms and walked into her son's room and said "can you look at my face it looks like my face is drooping on the right".  Son states that she had a slight droop to the right, and patient states she was having trouble squeezing and moving the left arm/hand.  Patient states she has some tingling to the left side of her face/left arm, but denies any visual changes/neck pain/headache/incontinence/seizure activity/dizziness/palpitations/shortness of breath/abdominal pain.  Patient states she has had nausea for the past 2 days but denies any vomiting/diarrhea.  States she has been having sweats on and off but she is perimenopausal and states that this has been typical over the last several months.  (LMP 9 months ago).  Patient denies any leg swelling, denies any trauma, denies any rashes, denies fever/URI complaints.  Patient is still an active smoker.  States she drinks only occasionally.  Patient expressed concern for stroke therefore made her son call EMS to bring patient to the hospital.    AVSS, patient NAD, NCAT, PERRL, EOMI, currently has no facial droop.  No carotid bruits.  Lungs: CTAB.  Heart: Regular S1-2.  Abdomen: Soft, obese, nontender, nondistended, + BS, no mass, no rebound or guarding.  EXT: No pedal edema, no calf tenderness, distal pulse intact.  Neuro: Alert and oriented x 3, patient has no current facial droop, no slurred speech, no ataxia, has paresthesias (tingling) to the left side of the forehead, and the left upper extremity, motor is intact.    A/P patient complaining of both chest pain and strokelike symptoms.  Technically at this point with symptoms resolving most likely will be TIA workup.  Neuro consulted as stroke code called.  Will upgrade patient from urgent care area to main.  Patient to get line lab placed on monitor and will be sent directly to CAT scan.    ALL: nkda  Meds: Lisinopril, statin, gabapentin, oxycodone 10 mg, albuterol, baby aspirin  SH +smoker daily  +etoh occ  PMD ?  Cardio Leandra Denny  LMP 9 months ago (perimenopausal)

## 2024-11-06 NOTE — ED CDU PROVIDER INITIAL DAY NOTE - PROGRESS NOTE DETAILS
Patient comfortable. Awaiting nuc stress and MRI Discussed case with neurology Attending Dr. Wright,  MRI negative likely TIA recommends 21 days of dual antiplatelet Patient comfortable. Awaiting nuc stress and MRI  sign out from DAVEY Mahmood

## 2024-11-06 NOTE — ED CDU PROVIDER DISPOSITION NOTE - PROVIDER TOKENS
PROVIDER:[TOKEN:[[id number]],FOLLOWUP:[4-6 Days]] PROVIDER:[TOKEN:[[id number]],FOLLOWUP:[4-6 Days]]

## 2024-11-06 NOTE — CONSULT NOTE ADULT - ATTENDING COMMENTS
Patient reports her LUE tingling/numbness is actually chronic and intermittent; a/w radiating neck pain. Reportedly had MRI C-spine done recently at outside facility and follows with pain management for this. Her right facial droop was transient. MRI Brain without acute stroke. CTA H/N without notable stenoses. Suspect TIA as etiology for transient R facial droop and cervical radiculopathy for LUE dysesthesias.     Recommend:  - continue DAPT for 21 days, followed by ASA monotherapy  - c/w home statin  - f/up with her pain management MD  - rest as above

## 2024-11-06 NOTE — ED PROVIDER NOTE - CLINICAL SUMMARY MEDICAL DECISION MAKING FREE TEXT BOX
I personally saw the patient. DAVEY Mahmood and I provided critical care for a total of 45 minutes. I provided a substantive portion of the care and the majority of the critical care time.    48-year-old female with past medical history of obesity, HTN, HLD, DM, asthma, chronic back problems with herniated disks, tubal ligation, gallstone removal (not gallbladder removal) presents to the ED for complaint of (1) mid sternal chest pain and (2) right facial droop associated with left upper extremity weakness at around 11:45 PM.  Patient here with her son, they state that she was having a heated conversation on the phone, she started to feel symptoms and walked into her son's room and said "can you look at my face it looks like my face is drooping on the right".  Son states that she had a slight droop to the right, and patient states she was having trouble squeezing and moving the left arm/hand.  Patient states she has some tingling to the left side of her face/left arm, but denies any visual changes/neck pain/headache/incontinence/seizure activity/dizziness/palpitations/shortness of breath/abdominal pain.  Patient states she has had nausea for the past 2 days but denies any vomiting/diarrhea.  States she has been having sweats on and off but she is perimenopausal and states that this has been typical over the last several months.  (LMP 9 months ago).  Patient denies any leg swelling, denies any trauma, denies any rashes, denies fever/URI complaints.  Patient is still an active smoker.  States she drinks only occasionally.  Patient expressed concern for stroke therefore made her son call EMS to bring patient to the hospital. Last CCTA and nuclear stress in 2022.     AVSS, patient NAD, NCAT, PERRL, EOMI, currently has no facial droop.  No carotid bruits.  Lungs: CTAB.  Heart: Regular S1-2.  Abdomen: Soft, obese, nontender, nondistended, + BS, no mass, no rebound or guarding.  EXT: No pedal edema, no calf tenderness, distal pulse intact.  Neuro: Alert and oriented x 3, patient has no current facial droop, no slurred speech, no ataxia, has paresthesias (tingling) to the left side of the forehead, and the left upper extremity, motor is intact.    A/P patient complaining of both chest pain and strokelike symptoms.  Technically at this point with symptoms resolving most likely will be TIA workup.  Neuro consulted as stroke code called. Full consult in the chart. Will upgrade patient from urgent care area to main.  Patient to get line &labs and  placed on monitor and will be sent directly to CAT scan. CT report showed no acute M/S/B. Neurology saw pt, initial NIH score 1, and repeat eval showed NIH score of 0 with rapidly improving symptoms. Neuro recommends pt be placed in obs for MRI, and to load with plavix 300 mg. Trop x 2 was <6. Pt therefore placed in OBS.

## 2024-11-06 NOTE — ED CDU PROVIDER DISPOSITION NOTE - CARE PROVIDER_API CALL
Denilson Rogers  Interventional Cardiology  [address] [place] [address], [address]  [place] [address]  Phone: [phone]  Fax: [phone]  Follow Up Time: 4-6 Days   Leandra Pandya  Cardiovascular Disease  [address] [place] [id number]  Phone: [phone]  Fax: [phone]  Follow Up Time: 4-6 Days

## 2024-11-06 NOTE — CONSULT NOTE ADULT - ASSESSMENT
48 left-handed female PMH current smoker morbid obesity HTN HLD DM  CAD (ASA) bipolar disorder asthma, chronic back pain BIBEMS for chest pain a/w LUE numbness and R facial droop s/p additional ASA 81 at home. Patient LKW 11pm, stroke code called 1:07AM. NIHSS 1 for LUE sensory (LT and noxious) that resolved 1.5 hours later. CTH negative, was not a candidate for TNK due to nondisabling sensory symptom. ____  CTP 5cc mismatch R temporal possibly artifact. CTA___  On re-examination NIHSS 0.     Recommendations:     48 left-handed female PMH current smoker morbid obesity HTN HLD DM  CAD (ASA) bipolar disorder asthma, chronic back pain BIBEMS for chest pain a/w LUE numbness and R facial droop s/p additional ASA 81 at home. Patient LKW 11pm, stroke code called 1:07AM. NIHSS 1 for LUE sensory (LT and noxious) that resolved 1.5 hours later. CTH negative, was not a candidate for TNK due to nondisabling sensory symptom. CTP 5cc mismatch R temporal likely artifact. CTA negative.  On re-examination NIHSS 0.     Recommendations:  - Cardiac workup  - MRI while obtaining cardiac workup  - Load with plavix 300mg followed by 75mg on 11/7  - Continue home aspirin 81mg  - Outpatient eval for ISATU    Plan discussed with attending. Daytime neurovascular spectra [phone] 48 left-handed female PMH current smoker morbid obesity HTN HLD DM  CAD (ASA) bipolar disorder asthma, chronic back pain BIBEMS for chest pain a/w LUE numbness and R facial droop s/p additional ASA 81 at home. Patient LKW 11pm, stroke code called 1:07AM. NIHSS 1 for LUE sensory (LT and noxious) that resolved 1.5 hours later. CTH negative, was not a candidate for TNK due to nondisabling sensory symptom. CTP 5cc mismatch R temporal likely artifact. CTA negative.  On re-examination NIHSS 0.     Recommendations:  - Cardiac workup  - MRI while obtaining cardiac workup  - Load with plavix 300mg followed by 75mg on 11/7  - Continue home aspirin 81mg, and atorvastatin 80mg  - Outpatient eval for ISATU    Plan discussed with attending. Daytime neurovascular spectra [phone]

## 2024-11-06 NOTE — CONSULT NOTE ADULT - SUBJECTIVE AND OBJECTIVE BOX
NEUROLOGY CONSULT    HPI:     MEDICATIONS  Home Medications:  albuterol 2.5 mg/3 mL (0.083%) inhalation solution: 3 milliliter(s) inhaled every 6 hours, As Needed (21 Mar 2022 16:29)  cyclobenzaprine 10 mg oral tablet: 1 tab(s) orally 2 times a day (21 Mar 2022 16:29)  gabapentin 400 mg oral capsule: 1 cap(s) orally 3 times a day (21 Mar 2022 16:29)  lisinopril 20 mg oral tablet: 1 tab(s) orally once a day (21 Mar 2022 16:29)  metFORMIN 1000 mg oral tablet, extended release: 1 tab(s) orally once a day (21 Mar 2022 16:29)  oxycodone-acetaminophen 10 mg-325 mg oral tablet: 1 tab(s) orally 2 times a day, As Needed (21 Mar 2022 16:29)  Vraylar 1.5 mg oral capsule: 1 cap(s) orally once a day (21 Mar 2022 16:29)    MEDICATIONS  (STANDING):    MEDICATIONS  (PRN):      FAMILY HISTORY:    SOCIAL HISTORY: negative for tobacco, alcohol, or ilicit drug use.    Allergies    No Known Allergies    Intolerances      NEURO EXAM  GEN: Obese female NAD, pleasant, cooperative  NEURO:   MENTAL STATUS: AAOx3  LANG/SPEECH: Fluent, intact naming, repetition & comprehension  CRANIAL NERVES:  II: Pupils equal and reactive, no RAPD, normal visual field  III, IV, VI: EOM intact, no gaze preference or deviation  V: normal  VII: no facial asymmetry  VIII: normal hearing to speech  MOTOR: 5/5 in both upper and lower extremities. No bradykinesia  REFLEXES: 2+ biceps 2+ patella  SENSORY: Reduced LT and pinprick LUE in to particular dermatomal distribution. No extinction with double simultaneous. Intact RUE and LE.   COORD: Normal finger to nose and heel to shin, no tremor, no dysmetria  Gait: Narrow based  NIHSS: 1    LABS:                        15.0   10.62 )-----------( 234      ( 06 Nov 2024 01:32 )             44.8     11-06    141  |  104  |  18  ----------------------------<  165[H]  4.0   |  27  |  0.9    Ca    9.3      06 Nov 2024 01:32  Mg     1.8     11-06    TPro  6.9  /  Alb  4.5  /  TBili  0.2  /  DBili  x   /  AST  14  /  ALT  13  /  AlkPhos  115  11-06    Hemoglobin A1C:   Vitamin B12   PT/INR - ( 06 Nov 2024 01:32 )   PT: 10.20 sec;   INR: 0.87 ratio           CAPILLARY BLOOD GLUCOSE  164 (06 Nov 2024 01:26)      POCT Blood Glucose.: 164 mg/dL (06 Nov 2024 01:08)      Urinalysis Basic - ( 06 Nov 2024 01:32 )    Color: x / Appearance: x / SG: x / pH: x  Gluc: 165 mg/dL / Ketone: x  / Bili: x / Urobili: x   Blood: x / Protein: x / Nitrite: x   Leuk Esterase: x / RBC: x / WBC x   Sq Epi: x / Non Sq Epi: x / Bacteria: x            Microbiology:      RADIOLOGY, EKG AND ADDITIONAL TESTS: Reviewed.           NEUROLOGY CONSULT    HPI: 48 left-handed female PMH current smoker morbid obesity HTN HLD DM  CAD (ASA) bipolar disorder asthma, chronic back pain BIBEMS for chest pain a/w LUE numbness and R facial droop. Patient LKW 11pm. Was stressed by her son and felt severe pressure chest pain 11:45 with radiating numbness down left arm and felt the right corner of mouth to be droopy. Her other son noted it was indeed lower. She took a second 81mg ASA and called EMS.  While in the ED, her facial droop had resolved and her sensory changes became less apparent. NIHSS 1 for sensory (LUE only, to LT and noxious) continuing to improve.  CTH No acute findings. CTP 5cc mismatch R temporal possibly artifact. CTA___  On re-examination NIHSS 0.     Social: Daily tobacco and marijuana, social etoh. Cocaine twice as a teenager.   Meds: atorvastatin 80, gabapentin 400mg TID, lisinopril 40mg, metformin 1000mg bid, oxycodone 20mg bid prn for back pain, albuterol inhaler     MEDICATIONS  Home Medications:  albuterol 2.5 mg/3 mL (0.083%) inhalation solution: 3 milliliter(s) inhaled every 6 hours, As Needed (21 Mar 2022 16:29)  cyclobenzaprine 10 mg oral tablet: 1 tab(s) orally 2 times a day (21 Mar 2022 16:29)  gabapentin 400 mg oral capsule: 1 cap(s) orally 3 times a day (21 Mar 2022 16:29)  lisinopril 20 mg oral tablet: 1 tab(s) orally once a day (21 Mar 2022 16:29)  metFORMIN 1000 mg oral tablet, extended release: 1 tab(s) orally once a day (21 Mar 2022 16:29)  oxycodone-acetaminophen 10 mg-325 mg oral tablet: 1 tab(s) orally 2 times a day, As Needed (21 Mar 2022 16:29)  Vraylar 1.5 mg oral capsule: 1 cap(s) orally once a day (21 Mar 2022 16:29)    MEDICATIONS  (STANDING):    MEDICATIONS  (PRN):      FAMILY HISTORY:    SOCIAL HISTORY: negative for tobacco, alcohol, or ilicit drug use.    Allergies    No Known Allergies    Intolerances      NEURO EXAM  GEN: Obese female NAD, pleasant, cooperative  NEURO:   MENTAL STATUS: AAOx3  LANG/SPEECH: Fluent, intact naming, repetition & comprehension  CRANIAL NERVES:  II: Pupils equal and reactive, no RAPD, normal visual field  III, IV, VI: EOM intact, no gaze preference or deviation  V: normal  VII: no facial asymmetry  VIII: normal hearing to speech  MOTOR: 5/5 in both upper and lower extremities. No bradykinesia  REFLEXES: 2+ biceps 2+ patella  SENSORY: Reduced LT and pinprick LUE in to particular dermatomal distribution that resolved on re-examination. No extinction with double simultaneous. Intact RUE and LE.   COORD: Normal finger to nose and heel to shin, no tremor, no dysmetria  Gait: Narrow based  NIHSS: 1--> 0    LABS:                        15.0   10.62 )-----------( 234      ( 06 Nov 2024 01:32 )             44.8     11-06    141  |  104  |  18  ----------------------------<  165[H]  4.0   |  27  |  0.9    Ca    9.3      06 Nov 2024 01:32  Mg     1.8     11-06    TPro  6.9  /  Alb  4.5  /  TBili  0.2  /  DBili  x   /  AST  14  /  ALT  13  /  AlkPhos  115  11-06    Hemoglobin A1C:   Vitamin B12   PT/INR - ( 06 Nov 2024 01:32 )   PT: 10.20 sec;   INR: 0.87 ratio           CAPILLARY BLOOD GLUCOSE  164 (06 Nov 2024 01:26)      POCT Blood Glucose.: 164 mg/dL (06 Nov 2024 01:08)      Urinalysis Basic - ( 06 Nov 2024 01:32 )    Color: x / Appearance: x / SG: x / pH: x  Gluc: 165 mg/dL / Ketone: x  / Bili: x / Urobili: x   Blood: x / Protein: x / Nitrite: x   Leuk Esterase: x / RBC: x / WBC x   Sq Epi: x / Non Sq Epi: x / Bacteria: x            Microbiology:      RADIOLOGY, EKG AND ADDITIONAL TESTS: Reviewed.           NEUROLOGY CONSULT    HPI: 48 left-handed female PMH current smoker morbid obesity HTN HLD DM  CAD (ASA) bipolar disorder asthma, chronic back pain BIBEMS for chest pain a/w LUE numbness and R facial droop. Patient LKW 11pm. Was stressed by her son and felt severe pressure chest pain 11:45 with radiating numbness down left arm and felt the right corner of mouth to be droopy. Her other son noted it was indeed lower. She took a second 81mg ASA and called EMS.  While in the ED, her facial droop had resolved and her sensory changes became less apparent. NIHSS 1 for sensory (LUE only, to LT and noxious) continuing to improve.  CTH No acute findings. CTP 5cc mismatch R temporal possibly artifact. CTA negative.  On re-examination NIHSS 0.     Social: Daily tobacco and marijuana, social etoh. Cocaine twice as a teenager.   Meds: atorvastatin 80, gabapentin 400mg TID, lisinopril 40mg, metformin 1000mg bid, oxycodone 20mg bid prn for back pain, albuterol inhaler     MEDICATIONS  Home Medications:  albuterol 2.5 mg/3 mL (0.083%) inhalation solution: 3 milliliter(s) inhaled every 6 hours, As Needed (21 Mar 2022 16:29)  cyclobenzaprine 10 mg oral tablet: 1 tab(s) orally 2 times a day (21 Mar 2022 16:29)  gabapentin 400 mg oral capsule: 1 cap(s) orally 3 times a day (21 Mar 2022 16:29)  lisinopril 20 mg oral tablet: 1 tab(s) orally once a day (21 Mar 2022 16:29)  metFORMIN 1000 mg oral tablet, extended release: 1 tab(s) orally once a day (21 Mar 2022 16:29)  oxycodone-acetaminophen 10 mg-325 mg oral tablet: 1 tab(s) orally 2 times a day, As Needed (21 Mar 2022 16:29)  Vraylar 1.5 mg oral capsule: 1 cap(s) orally once a day (21 Mar 2022 16:29)    MEDICATIONS  (STANDING):    MEDICATIONS  (PRN):      FAMILY HISTORY:    SOCIAL HISTORY: negative for tobacco, alcohol, or ilicit drug use.    Allergies    No Known Allergies    Intolerances      NEURO EXAM  GEN: Obese female NAD, pleasant, cooperative  NEURO:   MENTAL STATUS: AAOx3  LANG/SPEECH: Fluent, intact naming, repetition & comprehension  CRANIAL NERVES:  II: Pupils equal and reactive, no RAPD, normal visual field  III, IV, VI: EOM intact, no gaze preference or deviation  V: normal  VII: no facial asymmetry  VIII: normal hearing to speech  MOTOR: 5/5 in both upper and lower extremities. No bradykinesia  REFLEXES: 2+ biceps 2+ patella  SENSORY: Reduced LT and pinprick LUE in to particular dermatomal distribution that resolved on re-examination. No extinction with double simultaneous. Intact RUE and LE.   COORD: Normal finger to nose and heel to shin, no tremor, no dysmetria  Gait: Narrow based  NIHSS: 1--> 0    LABS:                        15.0   10.62 )-----------( 234      ( 06 Nov 2024 01:32 )             44.8     11-06    141  |  104  |  18  ----------------------------<  165[H]  4.0   |  27  |  0.9    Ca    9.3      06 Nov 2024 01:32  Mg     1.8     11-06    TPro  6.9  /  Alb  4.5  /  TBili  0.2  /  DBili  x   /  AST  14  /  ALT  13  /  AlkPhos  115  11-06    Hemoglobin A1C:   Vitamin B12   PT/INR - ( 06 Nov 2024 01:32 )   PT: 10.20 sec;   INR: 0.87 ratio           CAPILLARY BLOOD GLUCOSE  164 (06 Nov 2024 01:26)      POCT Blood Glucose.: 164 mg/dL (06 Nov 2024 01:08)      Urinalysis Basic - ( 06 Nov 2024 01:32 )    Color: x / Appearance: x / SG: x / pH: x  Gluc: 165 mg/dL / Ketone: x  / Bili: x / Urobili: x   Blood: x / Protein: x / Nitrite: x   Leuk Esterase: x / RBC: x / WBC x   Sq Epi: x / Non Sq Epi: x / Bacteria: x            Microbiology:      RADIOLOGY, EKG AND ADDITIONAL TESTS: Reviewed.    < from: CT Angio Neck Stroke Protocol w/ IV Cont (11.06.24 @ 01:48) >  IMPRESSION:  CT PERFUSION:  Small region of hypoperfusion (5 mL) in the right temporal lobe, likely   artifactual. No core infarct or ischemic penumbra.    CTA HEAD/NECK:  No vessel occlusion or hemodynamically significant stenosis. No evidence   of dissection in the neck. No intracranial vascular malformation.    < end of copied text >

## 2024-11-06 NOTE — ED CDU PROVIDER INITIAL DAY NOTE - CLINICAL SUMMARY MEDICAL DECISION MAKING FREE TEXT BOX
48-year-old female with past medical history of obesity, HTN, HLD, DM, asthma, chronic back problems with herniated disks, tubal ligation, gallstone removal (not gallbladder removal) presents to the ED for complaint of  mid sternal chest pain and  right facial droop associated with left upper extremity weakness at around 11:45 PM. In the ED stroke code activated. Ct's performed CT PERFUSION:Small region of hypoperfusion (5 mL) in the right temporal lobe, likely artifactual. No core infarct or ischemic penumbra.CTA HEAD/NECK:No vessel occlusion or hemodynamically significant stenosis. No evidence of dissection in the neck. No intracranial vascular malformation. High sensitivity troponin <6, and <6. Patient placed in observation for MRI and cardiac workup. Patient evaluated this morning No acute complaints weakness resolved. Anxiolytics given for MRI which was tolerated MR demonstrated < from: MR Head No Cont (11.06.24 @ 08:56) >No acute intracranial pathology.Nonspecific mild scattered foci of white matter signal abnormality which may be seen with chronic microvascular ischemic changes, migraine, or sequelae of remote inflammation/infection. Neurology recommended 3 weeks of dual antiplatelet which was sent to pharmacy. patient also had stress test performed which demonstrated  < from: NM Nuclear Stress Pharmacologic Multiple (11.06.24 @ 13:22) >1. NORMAL LEXISCAN / REST MYOCARDIAL PERFUSION SPECT TOMOGRAPHY, WITH NO EVIDENCE FOR ISCHEMIA DURING LEXISCAN INFUSION.2. NORMAL RESTING LEFT VENTRICULAR WALL MOTION AND WALL THICKENING.3. LEFT VENTRICULAR EJECTION FRACTION OF  76 % WHICH IS WITHIN RANGE OF NORMAL. Patient a spoken to in detail about results  All questions addressed.  Results of ED work up discussed and patient given a copy of the results. Patient has proper follow up. Return precautions given.
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
## 2024-11-08 PROBLEM — E11.9 DIABETES: Status: ACTIVE | Noted: 2024-11-08

## 2024-11-08 RX ORDER — TIRZEPATIDE 2.5 MG/.5ML
2.5 INJECTION, SOLUTION SUBCUTANEOUS
Qty: 4 | Refills: 0 | Status: ACTIVE | COMMUNITY
Start: 2024-11-08 | End: 1900-01-01

## 2024-11-08 RX ORDER — EZETIMIBE 10 MG/1
10 TABLET ORAL
Qty: 90 | Refills: 1 | Status: ACTIVE | COMMUNITY
Start: 2024-11-08 | End: 1900-01-01

## 2024-11-27 ENCOUNTER — NON-APPOINTMENT (OUTPATIENT)
Age: 49
End: 2024-11-27

## 2024-11-27 ENCOUNTER — APPOINTMENT (OUTPATIENT)
Dept: CARDIOLOGY | Facility: CLINIC | Age: 49
End: 2024-11-27
Payer: MEDICAID

## 2024-11-27 VITALS
BODY MASS INDEX: 48.21 KG/M2 | SYSTOLIC BLOOD PRESSURE: 134 MMHG | WEIGHT: 262 LBS | DIASTOLIC BLOOD PRESSURE: 84 MMHG | HEART RATE: 70 BPM | HEIGHT: 62 IN

## 2024-11-27 DIAGNOSIS — I10 ESSENTIAL (PRIMARY) HYPERTENSION: ICD-10-CM

## 2024-11-27 DIAGNOSIS — E78.5 HYPERLIPIDEMIA, UNSPECIFIED: ICD-10-CM

## 2024-11-27 DIAGNOSIS — I25.10 ATHEROSCLEROTIC HEART DISEASE OF NATIVE CORONARY ARTERY W/OUT ANGINA PECTORIS: ICD-10-CM

## 2024-11-27 DIAGNOSIS — G47.33 OBSTRUCTIVE SLEEP APNEA (ADULT) (PEDIATRIC): ICD-10-CM

## 2024-11-27 DIAGNOSIS — Z86.73 PERSONAL HISTORY OF TRANSIENT ISCHEMIC ATTACK (TIA), AND CEREBRAL INFARCTION W/OUT RESIDUAL DEFICITS: ICD-10-CM

## 2024-11-27 PROCEDURE — 93000 ELECTROCARDIOGRAM COMPLETE: CPT

## 2024-11-27 PROCEDURE — ZZZZZ: CPT

## 2024-11-27 PROCEDURE — 99214 OFFICE O/P EST MOD 30 MIN: CPT

## 2024-11-27 PROCEDURE — G2211 COMPLEX E/M VISIT ADD ON: CPT | Mod: NC

## 2024-11-27 RX ORDER — CLOPIDOGREL BISULFATE 75 MG/1
75 TABLET, FILM COATED ORAL DAILY
Refills: 0 | Status: ACTIVE | COMMUNITY

## 2024-12-02 ENCOUNTER — APPOINTMENT (OUTPATIENT)
Dept: CARDIOLOGY | Facility: CLINIC | Age: 49
End: 2024-12-02

## 2024-12-02 RX ORDER — SEMAGLUTIDE 0.68 MG/ML
2 INJECTION, SOLUTION SUBCUTANEOUS
Qty: 1 | Refills: 0 | Status: ACTIVE | COMMUNITY
Start: 2024-12-02 | End: 1900-01-01

## 2025-01-31 ENCOUNTER — RX RENEWAL (OUTPATIENT)
Age: 50
End: 2025-01-31

## 2025-01-31 RX ORDER — SEMAGLUTIDE 1.34 MG/ML
4 INJECTION, SOLUTION SUBCUTANEOUS
Qty: 1 | Refills: 0 | Status: ACTIVE | COMMUNITY
Start: 2025-01-31 | End: 1900-01-01

## 2025-02-13 ENCOUNTER — APPOINTMENT (OUTPATIENT)
Dept: CARDIOLOGY | Facility: CLINIC | Age: 50
End: 2025-02-13

## 2025-02-13 PROCEDURE — 99212 OFFICE O/P EST SF 10 MIN: CPT

## 2025-02-27 ENCOUNTER — APPOINTMENT (OUTPATIENT)
Dept: CARDIOLOGY | Facility: CLINIC | Age: 50
End: 2025-02-27

## 2025-02-27 ENCOUNTER — RX RENEWAL (OUTPATIENT)
Age: 50
End: 2025-02-27

## 2025-02-27 RX ORDER — SEMAGLUTIDE 2.68 MG/ML
8 INJECTION, SOLUTION SUBCUTANEOUS
Qty: 1 | Refills: 0 | Status: ACTIVE | COMMUNITY
Start: 2025-02-27 | End: 1900-01-01

## 2025-03-27 ENCOUNTER — RX RENEWAL (OUTPATIENT)
Age: 50
End: 2025-03-27

## 2025-04-22 ENCOUNTER — NON-APPOINTMENT (OUTPATIENT)
Age: 50
End: 2025-04-22

## 2025-04-24 ENCOUNTER — APPOINTMENT (OUTPATIENT)
Dept: CARDIOLOGY | Facility: CLINIC | Age: 50
End: 2025-04-24
Payer: MEDICAID

## 2025-04-24 VITALS
HEART RATE: 73 BPM | DIASTOLIC BLOOD PRESSURE: 80 MMHG | WEIGHT: 243 LBS | SYSTOLIC BLOOD PRESSURE: 110 MMHG | BODY MASS INDEX: 44.72 KG/M2 | HEIGHT: 62 IN

## 2025-04-24 DIAGNOSIS — Z72.0 TOBACCO USE: ICD-10-CM

## 2025-04-24 DIAGNOSIS — I10 ESSENTIAL (PRIMARY) HYPERTENSION: ICD-10-CM

## 2025-04-24 DIAGNOSIS — E78.5 HYPERLIPIDEMIA, UNSPECIFIED: ICD-10-CM

## 2025-04-24 DIAGNOSIS — E11.9 TYPE 2 DIABETES MELLITUS W/OUT COMPLICATIONS: ICD-10-CM

## 2025-04-24 DIAGNOSIS — I25.10 ATHEROSCLEROTIC HEART DISEASE OF NATIVE CORONARY ARTERY W/OUT ANGINA PECTORIS: ICD-10-CM

## 2025-04-24 PROCEDURE — 93000 ELECTROCARDIOGRAM COMPLETE: CPT

## 2025-04-24 PROCEDURE — G2211 COMPLEX E/M VISIT ADD ON: CPT | Mod: NC

## 2025-04-24 PROCEDURE — 99214 OFFICE O/P EST MOD 30 MIN: CPT

## 2025-06-05 VITALS — BODY MASS INDEX: 42.98 KG/M2 | WEIGHT: 235 LBS

## 2025-07-16 ENCOUNTER — APPOINTMENT (OUTPATIENT)
Dept: CARDIOLOGY | Facility: CLINIC | Age: 50
End: 2025-07-16

## 2025-08-30 ENCOUNTER — EMERGENCY (EMERGENCY)
Facility: HOSPITAL | Age: 50
LOS: 0 days | Discharge: HOME | End: 2025-08-31
Attending: STUDENT IN AN ORGANIZED HEALTH CARE EDUCATION/TRAINING PROGRAM
Payer: COMMERCIAL

## 2025-08-30 VITALS
HEIGHT: 62 IN | OXYGEN SATURATION: 99 % | HEART RATE: 78 BPM | SYSTOLIC BLOOD PRESSURE: 100 MMHG | DIASTOLIC BLOOD PRESSURE: 58 MMHG | RESPIRATION RATE: 18 BRPM | WEIGHT: 229.94 LBS | TEMPERATURE: 98 F

## 2025-08-30 DIAGNOSIS — M54.6 PAIN IN THORACIC SPINE: ICD-10-CM

## 2025-08-30 DIAGNOSIS — F17.200 NICOTINE DEPENDENCE, UNSPECIFIED, UNCOMPLICATED: ICD-10-CM
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
## 2025-08-30 DIAGNOSIS — M54.2 CERVICALGIA: ICD-10-CM

## 2025-08-30 DIAGNOSIS — M54.50 LOW BACK PAIN, UNSPECIFIED: ICD-10-CM
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
## 2025-08-30 PROCEDURE — 99284 EMERGENCY DEPT VISIT MOD MDM: CPT

## 2025-08-30 PROCEDURE — 96372 THER/PROPH/DIAG INJ SC/IM: CPT

## 2025-08-30 PROCEDURE — 99283 EMERGENCY DEPT VISIT LOW MDM: CPT | Mod: 25

## 2025-08-30 PROCEDURE — 99053 MED SERV 10PM-8AM 24 HR FAC: CPT

## 2025-08-31 VITALS
DIASTOLIC BLOOD PRESSURE: 65 MMHG | RESPIRATION RATE: 18 BRPM | TEMPERATURE: 98 F | HEART RATE: 71 BPM | OXYGEN SATURATION: 99 % | SYSTOLIC BLOOD PRESSURE: 110 MMHG

## 2025-08-31 RX ORDER — LIDOCAINE HYDROCHLORIDE 20 MG/ML
1 JELLY TOPICAL ONCE
Refills: 0 | Status: COMPLETED | OUTPATIENT
Start: 2025-08-31 | End: 2025-08-31

## 2025-08-31 RX ORDER — METHOCARBAMOL 500 MG/1
1500 TABLET, FILM COATED ORAL ONCE
Refills: 0 | Status: COMPLETED | OUTPATIENT
Start: 2025-08-31 | End: 2025-08-31

## 2025-08-31 RX ORDER — KETOROLAC TROMETHAMINE 30 MG/ML
30 INJECTION, SOLUTION INTRAMUSCULAR; INTRAVENOUS ONCE
Refills: 0 | Status: DISCONTINUED | OUTPATIENT
Start: 2025-08-31 | End: 2025-08-31

## 2025-08-31 RX ORDER — METHOCARBAMOL 500 MG/1
1 TABLET, FILM COATED ORAL
Qty: 9 | Refills: 0
Start: 2025-08-31 | End: 2025-09-02

## 2025-08-31 RX ADMIN — LIDOCAINE HYDROCHLORIDE 1 PATCH: 20 JELLY TOPICAL at 00:16

## 2025-08-31 RX ADMIN — METHOCARBAMOL 1500 MILLIGRAM(S): 500 TABLET, FILM COATED ORAL at 00:16

## 2025-08-31 RX ADMIN — KETOROLAC TROMETHAMINE 30 MILLIGRAM(S): 30 INJECTION, SOLUTION INTRAMUSCULAR; INTRAVENOUS at 00:16

## 2025-08-31 RX ADMIN — KETOROLAC TROMETHAMINE 30 MILLIGRAM(S): 30 INJECTION, SOLUTION INTRAMUSCULAR; INTRAVENOUS at 00:46
